# Patient Record
Sex: FEMALE | Race: WHITE
[De-identification: names, ages, dates, MRNs, and addresses within clinical notes are randomized per-mention and may not be internally consistent; named-entity substitution may affect disease eponyms.]

---

## 2020-01-19 ENCOUNTER — HOSPITAL ENCOUNTER (INPATIENT)
Dept: HOSPITAL 54 - ER | Age: 75
LOS: 3 days | Discharge: NURSING, REHAB OR LONG-TERM CARE | DRG: 133 | End: 2020-01-22
Attending: NURSE PRACTITIONER | Admitting: NURSE PRACTITIONER
Payer: MEDICAID

## 2020-01-19 VITALS — WEIGHT: 137 LBS | BODY MASS INDEX: 24.27 KG/M2 | HEIGHT: 63 IN

## 2020-01-19 VITALS — SYSTOLIC BLOOD PRESSURE: 124 MMHG | DIASTOLIC BLOOD PRESSURE: 58 MMHG

## 2020-01-19 VITALS — DIASTOLIC BLOOD PRESSURE: 64 MMHG | SYSTOLIC BLOOD PRESSURE: 126 MMHG

## 2020-01-19 VITALS — SYSTOLIC BLOOD PRESSURE: 118 MMHG | DIASTOLIC BLOOD PRESSURE: 60 MMHG

## 2020-01-19 VITALS — DIASTOLIC BLOOD PRESSURE: 57 MMHG | SYSTOLIC BLOOD PRESSURE: 126 MMHG

## 2020-01-19 DIAGNOSIS — Z99.2: ICD-10-CM

## 2020-01-19 DIAGNOSIS — G47.33: ICD-10-CM

## 2020-01-19 DIAGNOSIS — J96.01: Primary | ICD-10-CM

## 2020-01-19 DIAGNOSIS — N25.81: ICD-10-CM

## 2020-01-19 DIAGNOSIS — N17.9: ICD-10-CM

## 2020-01-19 DIAGNOSIS — I13.2: ICD-10-CM

## 2020-01-19 DIAGNOSIS — I27.20: ICD-10-CM

## 2020-01-19 DIAGNOSIS — E87.5: ICD-10-CM

## 2020-01-19 DIAGNOSIS — Z87.891: ICD-10-CM

## 2020-01-19 DIAGNOSIS — J44.9: ICD-10-CM

## 2020-01-19 DIAGNOSIS — Z95.828: ICD-10-CM

## 2020-01-19 DIAGNOSIS — E83.39: ICD-10-CM

## 2020-01-19 DIAGNOSIS — D63.8: ICD-10-CM

## 2020-01-19 DIAGNOSIS — N18.6: ICD-10-CM

## 2020-01-19 DIAGNOSIS — Z79.899: ICD-10-CM

## 2020-01-19 DIAGNOSIS — I50.9: ICD-10-CM

## 2020-01-19 LAB
ALBUMIN SERPL BCP-MCNC: 3.5 G/DL (ref 3.4–5)
ALP SERPL-CCNC: 214 U/L (ref 46–116)
ALT SERPL W P-5'-P-CCNC: 28 U/L (ref 12–78)
AST SERPL W P-5'-P-CCNC: 26 U/L (ref 15–37)
BASE EXCESS BLDA CALC-SCNC: -7.8 MMOL/L
BASOPHILS # BLD AUTO: 0 /CMM (ref 0–0.2)
BASOPHILS NFR BLD AUTO: 0.8 % (ref 0–2)
BILIRUB DIRECT SERPL-MCNC: 0.1 MG/DL (ref 0–0.2)
BILIRUB SERPL-MCNC: 0.4 MG/DL (ref 0.2–1)
BUN SERPL-MCNC: 99 MG/DL (ref 7–18)
CALCIUM SERPL-MCNC: 8.7 MG/DL (ref 8.5–10.1)
CHLORIDE SERPL-SCNC: 97 MMOL/L (ref 98–107)
CO2 SERPL-SCNC: 24 MMOL/L (ref 21–32)
CREAT SERPL-MCNC: 11.4 MG/DL (ref 0.6–1.3)
DO-HGB MFR BLDA: 143.3 MMHG
EOSINOPHIL NFR BLD AUTO: 3.4 % (ref 0–6)
GLUCOSE SERPL-MCNC: 97 MG/DL (ref 74–106)
HCT VFR BLD AUTO: 22 % (ref 33–45)
HGB BLD-MCNC: 7.2 G/DL (ref 11.5–14.8)
INHALED O2 CONCENTRATION: 36 %
INHALED O2 FLOW RATE: 4 L/MIN (ref 0–30)
LYMPHOCYTES NFR BLD AUTO: 0.3 /CMM (ref 0.8–4.8)
LYMPHOCYTES NFR BLD AUTO: 5.6 % (ref 20–44)
MCHC RBC AUTO-ENTMCNC: 33 G/DL (ref 31–36)
MCV RBC AUTO: 99 FL (ref 82–100)
MONOCYTES NFR BLD AUTO: 0.4 /CMM (ref 0.1–1.3)
MONOCYTES NFR BLD AUTO: 6.3 % (ref 2–12)
NEUTROPHILS # BLD AUTO: 5.3 /CMM (ref 1.8–8.9)
NEUTROPHILS NFR BLD AUTO: 83.9 % (ref 43–81)
PCO2 TEMP ADJ BLDA: 46.8 MMHG (ref 35–45)
PH TEMP ADJ BLDA: 7.23 [PH] (ref 7.35–7.45)
PLATELET # BLD AUTO: 267 /CMM (ref 150–450)
PO2 TEMP ADJ BLDA: 59.1 MMHG (ref 75–100)
POTASSIUM SERPL-SCNC: 7.3 MMOL/L (ref 3.5–5.1)
PROT SERPL-MCNC: 7.8 G/DL (ref 6.4–8.2)
RBC # BLD AUTO: 2.25 MIL/UL (ref 4–5.2)
SAO2 % BLDA: 80.3 % (ref 92–98.5)
SODIUM SERPL-SCNC: 137 MMOL/L (ref 136–145)
WBC NRBC COR # BLD AUTO: 6.3 K/UL (ref 4.3–11)

## 2020-01-19 PROCEDURE — A4216 STERILE WATER/SALINE, 10 ML: HCPCS

## 2020-01-19 PROCEDURE — G0378 HOSPITAL OBSERVATION PER HR: HCPCS

## 2020-01-19 RX ADMIN — CINACALCET HYDROCHLORIDE SCH MG: 30 TABLET, COATED ORAL at 23:34

## 2020-01-19 RX ADMIN — Medication SCH MG: at 23:34

## 2020-01-19 RX ADMIN — ATORVASTATIN CALCIUM SCH MG: 10 TABLET, FILM COATED ORAL at 23:34

## 2020-01-19 NOTE — NUR
RN ADMISSION NOTES,



RECEIVED PATIENT FROM ER DEPARTMENT  AT 2220 IN COMPANY OF 2 NURSES, A/O X3 ABLE TO 
VERBALIZED NEEDS, STATES FEELING HOT AND NOTED DIAPHORETIC, AND SWEATING, NS VAL AND NSR 
IN TELE MONITOR, CHECKED BLOOD SUGAR AND NOTED, 30M Gd/L, GAVE 2 JUICES VIA PO, AND CALLED 
REYNOSO ON CALL AND REPORTED CONDITION, REPLIED WITH NEW ORDER FRO DEXTROSE 50 INJ, NOTED 
AND CARRIED OUT, WITH VS 59, 104/45, 20, 98% AT 4LMP VIA NC, 95.8, SKIN INTACT, WILL 
CONTINUE TO MONITOR CLOSELY.

## 2020-01-19 NOTE — NUR
RN NOTES,



PATIENT INITIALLY WITH ORDER FOR ICU, TRANSFERRED TO ICU AT THI TIME, ENDORSED PATIENT TO ED 
CHARGE NURSE AND LOUISE RN, ENDORSED THAT PATIENT RECEIVED D50% AND NEED TO BE RECHECKED AT 
2317, PATIENT TRANSFERRED WITH ALL ACLS PROTOCOL.

## 2020-01-20 VITALS — SYSTOLIC BLOOD PRESSURE: 117 MMHG | DIASTOLIC BLOOD PRESSURE: 62 MMHG

## 2020-01-20 VITALS — SYSTOLIC BLOOD PRESSURE: 124 MMHG | DIASTOLIC BLOOD PRESSURE: 61 MMHG

## 2020-01-20 VITALS — SYSTOLIC BLOOD PRESSURE: 148 MMHG | DIASTOLIC BLOOD PRESSURE: 59 MMHG

## 2020-01-20 VITALS — DIASTOLIC BLOOD PRESSURE: 68 MMHG | SYSTOLIC BLOOD PRESSURE: 138 MMHG

## 2020-01-20 VITALS — SYSTOLIC BLOOD PRESSURE: 132 MMHG | DIASTOLIC BLOOD PRESSURE: 60 MMHG

## 2020-01-20 VITALS — SYSTOLIC BLOOD PRESSURE: 121 MMHG | DIASTOLIC BLOOD PRESSURE: 59 MMHG

## 2020-01-20 VITALS — SYSTOLIC BLOOD PRESSURE: 142 MMHG | DIASTOLIC BLOOD PRESSURE: 66 MMHG

## 2020-01-20 VITALS — SYSTOLIC BLOOD PRESSURE: 154 MMHG | DIASTOLIC BLOOD PRESSURE: 83 MMHG

## 2020-01-20 VITALS — DIASTOLIC BLOOD PRESSURE: 59 MMHG | SYSTOLIC BLOOD PRESSURE: 135 MMHG

## 2020-01-20 VITALS — SYSTOLIC BLOOD PRESSURE: 121 MMHG | DIASTOLIC BLOOD PRESSURE: 60 MMHG

## 2020-01-20 VITALS — SYSTOLIC BLOOD PRESSURE: 122 MMHG | DIASTOLIC BLOOD PRESSURE: 55 MMHG

## 2020-01-20 VITALS — SYSTOLIC BLOOD PRESSURE: 138 MMHG | DIASTOLIC BLOOD PRESSURE: 62 MMHG

## 2020-01-20 VITALS — SYSTOLIC BLOOD PRESSURE: 109 MMHG | DIASTOLIC BLOOD PRESSURE: 53 MMHG

## 2020-01-20 VITALS — SYSTOLIC BLOOD PRESSURE: 128 MMHG | DIASTOLIC BLOOD PRESSURE: 62 MMHG

## 2020-01-20 VITALS — DIASTOLIC BLOOD PRESSURE: 52 MMHG | SYSTOLIC BLOOD PRESSURE: 117 MMHG

## 2020-01-20 VITALS — DIASTOLIC BLOOD PRESSURE: 72 MMHG | SYSTOLIC BLOOD PRESSURE: 144 MMHG

## 2020-01-20 VITALS — SYSTOLIC BLOOD PRESSURE: 150 MMHG | DIASTOLIC BLOOD PRESSURE: 73 MMHG

## 2020-01-20 VITALS — SYSTOLIC BLOOD PRESSURE: 149 MMHG | DIASTOLIC BLOOD PRESSURE: 70 MMHG

## 2020-01-20 VITALS — DIASTOLIC BLOOD PRESSURE: 59 MMHG | SYSTOLIC BLOOD PRESSURE: 122 MMHG

## 2020-01-20 VITALS — SYSTOLIC BLOOD PRESSURE: 143 MMHG | DIASTOLIC BLOOD PRESSURE: 70 MMHG

## 2020-01-20 LAB
BASE EXCESS BLDA CALC-SCNC: 2.9 MMOL/L
BASOPHILS # BLD AUTO: 0 /CMM (ref 0–0.2)
BASOPHILS NFR BLD AUTO: 0.5 % (ref 0–2)
BUN SERPL-MCNC: 100 MG/DL (ref 7–18)
CALCIUM SERPL-MCNC: 8.3 MG/DL (ref 8.5–10.1)
CHLORIDE SERPL-SCNC: 95 MMOL/L (ref 98–107)
CHOLEST SERPL-MCNC: 141 MG/DL (ref ?–200)
CO2 SERPL-SCNC: 25 MMOL/L (ref 21–32)
CREAT SERPL-MCNC: 11.3 MG/DL (ref 0.6–1.3)
DO-HGB MFR BLDA: 120.2 MMHG
EOSINOPHIL NFR BLD AUTO: 1 % (ref 0–6)
GLUCOSE SERPL-MCNC: 63 MG/DL (ref 74–106)
HCT VFR BLD AUTO: 22 % (ref 33–45)
HDLC SERPL-MCNC: 108 MG/DL (ref 40–60)
HGB BLD-MCNC: 7.1 G/DL (ref 11.5–14.8)
INHALED O2 CONCENTRATION: 36 %
INHALED O2 FLOW RATE: 4 L/MIN (ref 0–30)
LDLC SERPL DIRECT ASSAY-MCNC: 26 MG/DL (ref 0–99)
LYMPHOCYTES NFR BLD AUTO: 0.4 /CMM (ref 0.8–4.8)
LYMPHOCYTES NFR BLD AUTO: 7 % (ref 20–44)
MAGNESIUM SERPL-MCNC: 3.1 MG/DL (ref 1.8–2.4)
MCHC RBC AUTO-ENTMCNC: 33 G/DL (ref 31–36)
MCV RBC AUTO: 99 FL (ref 82–100)
MONOCYTES NFR BLD AUTO: 0.3 /CMM (ref 0.1–1.3)
MONOCYTES NFR BLD AUTO: 5.3 % (ref 2–12)
NEUTROPHILS # BLD AUTO: 5.3 /CMM (ref 1.8–8.9)
NEUTROPHILS NFR BLD AUTO: 86.2 % (ref 43–81)
PCO2 TEMP ADJ BLDA: 43 MMHG (ref 35–45)
PH TEMP ADJ BLDA: 7.42 [PH] (ref 7.35–7.45)
PHOSPHATE SERPL-MCNC: 9.6 MG/DL (ref 2.5–4.9)
PLATELET # BLD AUTO: 251 /CMM (ref 150–450)
PO2 TEMP ADJ BLDA: 86.6 MMHG (ref 75–100)
POTASSIUM SERPL-SCNC: 7.1 MMOL/L (ref 3.5–5.1)
RBC # BLD AUTO: 2.18 MIL/UL (ref 4–5.2)
SAO2 % BLDA: 95 % (ref 92–98.5)
SODIUM SERPL-SCNC: 134 MMOL/L (ref 136–145)
TRIGL SERPL-MCNC: < 15 MG/DL (ref 30–150)
TSH SERPL DL<=0.005 MIU/L-ACNC: 2.65 UIU/ML (ref 0.36–3.74)
VENTILATION MODE VENT: (no result)
WBC NRBC COR # BLD AUTO: 6.2 K/UL (ref 4.3–11)

## 2020-01-20 PROCEDURE — 5A1D70Z PERFORMANCE OF URINARY FILTRATION, INTERMITTENT, LESS THAN 6 HOURS PER DAY: ICD-10-PCS | Performed by: INTERNAL MEDICINE

## 2020-01-20 RX ADMIN — ALBUTEROL SULFATE SCH MG: 1.25 SOLUTION RESPIRATORY (INHALATION) at 16:26

## 2020-01-20 RX ADMIN — CALCIUM CARBONATE-CHOLECALCIFEROL TAB 250 MG-125 UNIT SCH UDTAB: 250-125 TAB at 08:56

## 2020-01-20 RX ADMIN — Medication SCH MG: at 17:06

## 2020-01-20 RX ADMIN — FUROSEMIDE SCH MG: 40 TABLET ORAL at 17:06

## 2020-01-20 RX ADMIN — Medication SCH MG: at 07:21

## 2020-01-20 RX ADMIN — Medication SCH MG: at 09:00

## 2020-01-20 RX ADMIN — RENAGEL SCH MG: 800 TABLET ORAL at 13:11

## 2020-01-20 RX ADMIN — CLONIDINE HYDROCHLORIDE SCH MG: 0.1 TABLET ORAL at 17:06

## 2020-01-20 RX ADMIN — Medication SCH MG: at 23:14

## 2020-01-20 RX ADMIN — Medication SCH MG: at 16:26

## 2020-01-20 RX ADMIN — FUROSEMIDE SCH MG: 40 TABLET ORAL at 09:00

## 2020-01-20 RX ADMIN — CINACALCET HYDROCHLORIDE SCH MG: 30 TABLET, COATED ORAL at 23:14

## 2020-01-20 RX ADMIN — RENAGEL SCH MG: 800 TABLET ORAL at 17:06

## 2020-01-20 RX ADMIN — Medication SCH MG: at 13:11

## 2020-01-20 RX ADMIN — Medication SCH MG: at 08:56

## 2020-01-20 RX ADMIN — PANTOPRAZOLE SODIUM SCH MG: 40 TABLET, DELAYED RELEASE ORAL at 07:21

## 2020-01-20 RX ADMIN — Medication SCH MG: at 20:23

## 2020-01-20 RX ADMIN — CLONIDINE HYDROCHLORIDE SCH MG: 0.1 TABLET ORAL at 08:57

## 2020-01-20 RX ADMIN — ALBUTEROL SULFATE SCH MG: 1.25 SOLUTION RESPIRATORY (INHALATION) at 20:23

## 2020-01-20 RX ADMIN — RENAGEL SCH MG: 800 TABLET ORAL at 08:56

## 2020-01-20 RX ADMIN — ALBUTEROL SULFATE SCH MG: 1.25 SOLUTION RESPIRATORY (INHALATION) at 23:57

## 2020-01-20 RX ADMIN — ATORVASTATIN CALCIUM SCH MG: 10 TABLET, FILM COATED ORAL at 23:14

## 2020-01-20 RX ADMIN — Medication SCH MG: at 23:57

## 2020-01-20 NOTE — NUR
ICU RN AM NOTES

 

PT IN BED, ASLEEP, RESPONDS TO NAME AND TOUCH, ALERT/ORIENTED X 2-3, PERIOD OF CONFUSION, ON 
4L O2 VIA NASAL CANULA, REFUSE TO MOVE OR TURN AND BODY ASSESSMENT, SOB NOTED ON EXERTION OR 
TURNING, SINUS VAL TO SINUS RHYTHM HR 58 -63. DENIES PAIN, AFEBRILE, PARTH SHUNT THRILL 
PRESENT, LCW HD CATH CDI DRESSING. RT AC G 20 HL FLUSHES WELL, SITE CLEAR. WEARS DIAPER, 
CCHO DIET, SAFETY MEASURES IN PLACE, BED LOW LOCKED, WILL CONTINUE CLOSE MONITORING.

## 2020-01-20 NOTE — NUR
RN NOTES



PER TATO CHARGE NURSE, GIVE REPORT TO INCOMING NURSE AT TELE 1 FLOOR AND TRANSFER NEXT SHIFT.

## 2020-01-20 NOTE — NUR
TELE1/RN

ASSUMED CARE OF THIS PATIENT FOR CONTINUITY OF CARE, PATIENT IS AWAKE, ALERT, ORIENTED, MILD 
ANXIETY NOTED, NO DISTRESS NOTED, FALL RISK, FALL PRECAUTIONS IN PLACE. WILL MONITOR.

## 2020-01-20 NOTE — NUR
ICU RN

PT IS ANXIOUS, NONCOMPLIANT. CRITICAL LABS K-7.1, BUN-100, CREAT. 11.3, PHOS-9.6. JAYME REYNOSO WAS NOTIFIED. HE ORDERED ANOTHER KAYEXALATE 30 G PO & STATED THAT HD NURSE WILL COME 
AROUND 7-8 A.M. FOR STAT HD. BATH GIVEN, LINEN CHANGED. DENIES SOB OR PAIN. VITAL SIGNS 
STABLE.

## 2020-01-20 NOTE — NUR
TELE RN OPENING NOTE



RECEIVED PT TO -1 TRANSFERRED FROM ICU. PT AWAKE IN BED, ALERT AND ORIENTED TO NAME, 
PLACE AND TIME, ON 02 VIA NC 2L/MIN, SATURATING WELL, NO SIGNS OF RESPIRATORY DISTRESS 
NOTED. ON TELE MONITOR SINUS RHYTHM HR 68. DENIES PAIN. PARTH SHUNT THRILL PRESENT, LCW HD 
CATH DRESSING INTACT, CLEAN, DRY. IV SITE ON RIGHT AC G20 PATENT, INTACT, WITH HEP LOCK IN 
PLACE. INTRODUCED SELF TO PT, DISCUSSED PLAN OF CARE. BED IN LOW POSITION, LOCKED, CALL 
LIGHT WITHIN REACH.

## 2020-01-20 NOTE — NUR
ICU RN CLOSING NOTES

 

PT IN BED, RESTING COMFORTABLY, ALERT/ORIENTED X 2-3, PERIOD OF CONFUSION, ON 4L O2 VIA 
NASAL CANULA, SOB NOTED ON EXERTION OR TURNING, SINUS VAL TO SINUS RHYTHM HR 58 -68. 
DENIES PAIN, AFEBRILE, PARTH SHUNT THRILL PRESENT, LCW HD CATH CDI DRESSING. RT AC G 20 HL 
FLUSHES WELL, SITE CLEAR. WEARS DIAPER, CCHO DIET, SAFETY MEASURES IN PLACE, BED LOW LOCKED, 
PATIENT ABLE TO TURN SELF INDEPENDENTLY. PM CARE DONE. CALL LIGHT WITHIN REACH. ALL NEEDS 
MET AT THIS TIME. HOB ELEVATED. WILL ENDORSE TO NEXT SHIFT FOR DYAN.

## 2020-01-20 NOTE — NUR
ICU RN

 PT WAS TRANSFERRED FROM [facility] WITH DIAGNOSIS SEVERE HYPERKALEMIA. PT HAS ESRD, SHE MISSED HER 
SCHEDULED HD. BUN-99, CREAT-11.4, K-7.3. PT WAS GIVEN INSULIN 6 UNITS IV, D 50 IV, CALCIUM 
GLUCONATE IV, AND KAYEXALATE 30 G PO.PT IS AWAKE ALERT ORIENTED. ZAID. MOVES ALL 
EXTREMITIES, NO MOTOR OR SENSORY DEFICIT. C/O OF NO PAIN, SOB OR ANY OTHER DISTRESS. VSS, 
AFEBRILE, SCOPE-SB. 02 4 L VIA N/C, TOLERATES WELL. PT IS SUPPOSED TO HAVE NEPHROLOGY & 
PULMONOLOGY CONSULTATION IN A.M. AS WELL AS CTA WITHOUT CONTRAST TO R/O LUNG MASS. WILL 
CONTINUE CLOSE MONITORING.

## 2020-01-21 VITALS — SYSTOLIC BLOOD PRESSURE: 148 MMHG | DIASTOLIC BLOOD PRESSURE: 71 MMHG

## 2020-01-21 VITALS — SYSTOLIC BLOOD PRESSURE: 195 MMHG | DIASTOLIC BLOOD PRESSURE: 78 MMHG

## 2020-01-21 VITALS — DIASTOLIC BLOOD PRESSURE: 59 MMHG | SYSTOLIC BLOOD PRESSURE: 145 MMHG

## 2020-01-21 VITALS — DIASTOLIC BLOOD PRESSURE: 79 MMHG | SYSTOLIC BLOOD PRESSURE: 185 MMHG

## 2020-01-21 VITALS — DIASTOLIC BLOOD PRESSURE: 45 MMHG | SYSTOLIC BLOOD PRESSURE: 116 MMHG

## 2020-01-21 VITALS — DIASTOLIC BLOOD PRESSURE: 84 MMHG | SYSTOLIC BLOOD PRESSURE: 182 MMHG

## 2020-01-21 VITALS — DIASTOLIC BLOOD PRESSURE: 63 MMHG | SYSTOLIC BLOOD PRESSURE: 136 MMHG

## 2020-01-21 VITALS — SYSTOLIC BLOOD PRESSURE: 136 MMHG | DIASTOLIC BLOOD PRESSURE: 63 MMHG

## 2020-01-21 VITALS — SYSTOLIC BLOOD PRESSURE: 178 MMHG | DIASTOLIC BLOOD PRESSURE: 87 MMHG

## 2020-01-21 VITALS — DIASTOLIC BLOOD PRESSURE: 65 MMHG | SYSTOLIC BLOOD PRESSURE: 192 MMHG

## 2020-01-21 VITALS — DIASTOLIC BLOOD PRESSURE: 110 MMHG | SYSTOLIC BLOOD PRESSURE: 200 MMHG

## 2020-01-21 LAB
BASOPHILS # BLD AUTO: 0 /CMM (ref 0–0.2)
BASOPHILS NFR BLD AUTO: 0.8 % (ref 0–2)
BUN SERPL-MCNC: 57 MG/DL (ref 7–18)
CALCIUM SERPL-MCNC: 8 MG/DL (ref 8.5–10.1)
CHLORIDE SERPL-SCNC: 96 MMOL/L (ref 98–107)
CO2 SERPL-SCNC: 28 MMOL/L (ref 21–32)
CREAT SERPL-MCNC: 7.7 MG/DL (ref 0.6–1.3)
EOSINOPHIL NFR BLD AUTO: 5.2 % (ref 0–6)
EOSINOPHIL NFR BLD MANUAL: 6 % (ref 0–4)
GLUCOSE SERPL-MCNC: 86 MG/DL (ref 74–106)
HCT VFR BLD AUTO: 19 % (ref 33–45)
HGB BLD-MCNC: 6.1 G/DL (ref 11.5–14.8)
LYMPHOCYTES NFR BLD AUTO: 0.5 /CMM (ref 0.8–4.8)
LYMPHOCYTES NFR BLD AUTO: 12.7 % (ref 20–44)
LYMPHOCYTES NFR BLD MANUAL: 14 % (ref 16–48)
MCHC RBC AUTO-ENTMCNC: 33 G/DL (ref 31–36)
MCV RBC AUTO: 98 FL (ref 82–100)
MONOCYTES NFR BLD AUTO: 0.5 /CMM (ref 0.1–1.3)
MONOCYTES NFR BLD AUTO: 12.4 % (ref 2–12)
MONOCYTES NFR BLD MANUAL: 12 % (ref 0–11)
NEUTROPHILS # BLD AUTO: 3 /CMM (ref 1.8–8.9)
NEUTROPHILS NFR BLD AUTO: 68.9 % (ref 43–81)
NEUTS SEG NFR BLD MANUAL: 68 % (ref 42–76)
PLATELET # BLD AUTO: 228 /CMM (ref 150–450)
POTASSIUM SERPL-SCNC: 4.7 MMOL/L (ref 3.5–5.1)
RBC # BLD AUTO: 1.88 MIL/UL (ref 4–5.2)
SODIUM SERPL-SCNC: 136 MMOL/L (ref 136–145)
WBC NRBC COR # BLD AUTO: 4.3 K/UL (ref 4.3–11)

## 2020-01-21 PROCEDURE — 30233N1 TRANSFUSION OF NONAUTOLOGOUS RED BLOOD CELLS INTO PERIPHERAL VEIN, PERCUTANEOUS APPROACH: ICD-10-PCS | Performed by: INTERNAL MEDICINE

## 2020-01-21 RX ADMIN — Medication SCH MG: at 16:22

## 2020-01-21 RX ADMIN — ALBUTEROL SULFATE SCH MG: 1.25 SOLUTION RESPIRATORY (INHALATION) at 10:48

## 2020-01-21 RX ADMIN — Medication SCH MG: at 12:32

## 2020-01-21 RX ADMIN — Medication SCH MG: at 03:26

## 2020-01-21 RX ADMIN — Medication SCH MG: at 22:02

## 2020-01-21 RX ADMIN — RENAGEL SCH MG: 800 TABLET ORAL at 12:32

## 2020-01-21 RX ADMIN — CLONIDINE HYDROCHLORIDE SCH MG: 0.1 TABLET ORAL at 12:48

## 2020-01-21 RX ADMIN — PANTOPRAZOLE SODIUM SCH MG: 40 TABLET, DELAYED RELEASE ORAL at 08:33

## 2020-01-21 RX ADMIN — RENAGEL SCH MG: 800 TABLET ORAL at 08:33

## 2020-01-21 RX ADMIN — Medication SCH MG: at 16:18

## 2020-01-21 RX ADMIN — ALBUTEROL SULFATE SCH MG: 1.25 SOLUTION RESPIRATORY (INHALATION) at 16:18

## 2020-01-21 RX ADMIN — CALCIUM CARBONATE-CHOLECALCIFEROL TAB 250 MG-125 UNIT SCH UDTAB: 250-125 TAB at 08:34

## 2020-01-21 RX ADMIN — ALBUTEROL SULFATE SCH MG: 1.25 SOLUTION RESPIRATORY (INHALATION) at 20:31

## 2020-01-21 RX ADMIN — FUROSEMIDE SCH MG: 40 TABLET ORAL at 16:22

## 2020-01-21 RX ADMIN — Medication SCH MG: at 17:13

## 2020-01-21 RX ADMIN — FUROSEMIDE SCH MG: 40 TABLET ORAL at 08:34

## 2020-01-21 RX ADMIN — ALBUTEROL SULFATE SCH MG: 1.25 SOLUTION RESPIRATORY (INHALATION) at 07:37

## 2020-01-21 RX ADMIN — RENAGEL SCH MG: 800 TABLET ORAL at 16:21

## 2020-01-21 RX ADMIN — Medication SCH MG: at 08:34

## 2020-01-21 RX ADMIN — Medication SCH MG: at 07:38

## 2020-01-21 RX ADMIN — Medication SCH MG: at 08:33

## 2020-01-21 RX ADMIN — CINACALCET HYDROCHLORIDE SCH MG: 30 TABLET, COATED ORAL at 22:03

## 2020-01-21 RX ADMIN — Medication SCH MG: at 20:30

## 2020-01-21 RX ADMIN — CLONIDINE HYDROCHLORIDE SCH MG: 0.1 TABLET ORAL at 16:22

## 2020-01-21 RX ADMIN — CLONIDINE HYDROCHLORIDE SCH MG: 0.1 TABLET ORAL at 08:34

## 2020-01-21 RX ADMIN — Medication SCH MG: at 23:40

## 2020-01-21 RX ADMIN — Medication SCH MG: at 10:48

## 2020-01-21 RX ADMIN — ALBUTEROL SULFATE SCH MG: 1.25 SOLUTION RESPIRATORY (INHALATION) at 23:40

## 2020-01-21 RX ADMIN — ALBUTEROL SULFATE SCH MG: 1.25 SOLUTION RESPIRATORY (INHALATION) at 03:25

## 2020-01-21 RX ADMIN — ATORVASTATIN CALCIUM SCH MG: 10 TABLET, FILM COATED ORAL at 22:02

## 2020-01-21 NOTE — NUR
MS RN OPENING NOTES,



RECEIVED PATIENT IN BED AWAKE, RESTING COMFORTABLY, A/O X 2, ON 2L NS TOLERATING WELL. NO 
SOB OR ACUTE DISTRESS NOTED AT THIS TIME. IV ON RAC #20, LCW HD CATH, L ARM AV SHUNT, BED IN 
LOW/LOCKED POSITION. CALL LIGHT WITHIN REACH, WILL CONTINUE TO MONITOR.

## 2020-01-21 NOTE — NUR
TELE RN NOTES

 

On HD now start blood transfusion 1 unit of packed red blood cells by hd nurse Will continue 
to monitor and assess per transfusion protocol.

## 2020-01-21 NOTE — NUR
ms rn note 

 all needs attended having dinner , able to eat self not in distress at this time, will cont 
to monitor

## 2020-01-21 NOTE — NUR
tele rn note 

 patient in bed,2, on tele monitor hr 65 , hl rt ac hl intact ,lt cw hd cath in place, awake 
alert oriented x2, rt at bedside on breathing tx at this time , plan of care discussed with 
patient,lt arm av shunt palpable but using for hd at this time , bed in lowest and locked 
position , will cont to monitor,call light within reach

## 2020-01-21 NOTE — NUR
tele rn note

 hg 6.1 dr dr gannon at bedside aware about this with order to transfuse 1 unit prbs with hd 
, also Catapres and Procardia hold at this time , patient will have hd

## 2020-01-21 NOTE — NUR
TELE1/RN

PATIENT SLEEPING, AROUSABLE, APPEAR COMFORTABLE, NO SIGNS OF DISTRESS NOTED ALL NEEDS 
ATTENDED AT THIS TIME, WILL CONTINUE TO MONITOR.

## 2020-01-22 VITALS — DIASTOLIC BLOOD PRESSURE: 58 MMHG | SYSTOLIC BLOOD PRESSURE: 140 MMHG

## 2020-01-22 VITALS — DIASTOLIC BLOOD PRESSURE: 65 MMHG | SYSTOLIC BLOOD PRESSURE: 128 MMHG

## 2020-01-22 VITALS — SYSTOLIC BLOOD PRESSURE: 128 MMHG | DIASTOLIC BLOOD PRESSURE: 65 MMHG

## 2020-01-22 VITALS — SYSTOLIC BLOOD PRESSURE: 145 MMHG | DIASTOLIC BLOOD PRESSURE: 70 MMHG

## 2020-01-22 LAB
ALBUMIN SERPL BCP-MCNC: 3.1 G/DL (ref 3.4–5)
ALP SERPL-CCNC: 186 U/L (ref 46–116)
ALT SERPL W P-5'-P-CCNC: 30 U/L (ref 12–78)
AST SERPL W P-5'-P-CCNC: 22 U/L (ref 15–37)
BASOPHILS # BLD AUTO: 0 /CMM (ref 0–0.2)
BASOPHILS NFR BLD AUTO: 0.7 % (ref 0–2)
BILIRUB SERPL-MCNC: 0.4 MG/DL (ref 0.2–1)
BUN SERPL-MCNC: 53 MG/DL (ref 7–18)
CALCIUM SERPL-MCNC: 8.3 MG/DL (ref 8.5–10.1)
CHLORIDE SERPL-SCNC: 100 MMOL/L (ref 98–107)
CO2 SERPL-SCNC: 27 MMOL/L (ref 21–32)
CREAT SERPL-MCNC: 6.3 MG/DL (ref 0.6–1.3)
EOSINOPHIL NFR BLD AUTO: 12 % (ref 0–6)
GLUCOSE SERPL-MCNC: 86 MG/DL (ref 74–106)
HCT VFR BLD AUTO: 23 % (ref 33–45)
HGB BLD-MCNC: 7.4 G/DL (ref 11.5–14.8)
LYMPHOCYTES NFR BLD AUTO: 0.6 /CMM (ref 0.8–4.8)
LYMPHOCYTES NFR BLD AUTO: 11.9 % (ref 20–44)
MAGNESIUM SERPL-MCNC: 2.4 MG/DL (ref 1.8–2.4)
MCHC RBC AUTO-ENTMCNC: 33 G/DL (ref 31–36)
MCV RBC AUTO: 97 FL (ref 82–100)
MONOCYTES NFR BLD AUTO: 0.5 /CMM (ref 0.1–1.3)
MONOCYTES NFR BLD AUTO: 10.8 % (ref 2–12)
NEUTROPHILS # BLD AUTO: 3.2 /CMM (ref 1.8–8.9)
NEUTROPHILS NFR BLD AUTO: 64.6 % (ref 43–81)
PHOSPHATE SERPL-MCNC: 6.7 MG/DL (ref 2.5–4.9)
PLATELET # BLD AUTO: 207 /CMM (ref 150–450)
POTASSIUM SERPL-SCNC: 4.9 MMOL/L (ref 3.5–5.1)
PROT SERPL-MCNC: 7.1 G/DL (ref 6.4–8.2)
RBC # BLD AUTO: 2.33 MIL/UL (ref 4–5.2)
SODIUM SERPL-SCNC: 139 MMOL/L (ref 136–145)
WBC NRBC COR # BLD AUTO: 5 K/UL (ref 4.3–11)

## 2020-01-22 RX ADMIN — Medication SCH MG: at 17:42

## 2020-01-22 RX ADMIN — Medication SCH MG: at 15:04

## 2020-01-22 RX ADMIN — ALBUTEROL SULFATE SCH MG: 1.25 SOLUTION RESPIRATORY (INHALATION) at 15:04

## 2020-01-22 RX ADMIN — ALBUTEROL SULFATE SCH MG: 1.25 SOLUTION RESPIRATORY (INHALATION) at 07:08

## 2020-01-22 RX ADMIN — Medication SCH MG: at 13:20

## 2020-01-22 RX ADMIN — Medication SCH MG: at 03:43

## 2020-01-22 RX ADMIN — Medication SCH MG: at 09:29

## 2020-01-22 RX ADMIN — Medication SCH MG: at 09:28

## 2020-01-22 RX ADMIN — RENAGEL SCH MG: 800 TABLET ORAL at 17:42

## 2020-01-22 RX ADMIN — CALCIUM CARBONATE-CHOLECALCIFEROL TAB 250 MG-125 UNIT SCH UDTAB: 250-125 TAB at 09:37

## 2020-01-22 RX ADMIN — CLONIDINE HYDROCHLORIDE SCH MG: 0.1 TABLET ORAL at 09:30

## 2020-01-22 RX ADMIN — Medication SCH MG: at 07:09

## 2020-01-22 RX ADMIN — Medication SCH MG: at 17:43

## 2020-01-22 RX ADMIN — RENAGEL SCH MG: 800 TABLET ORAL at 13:20

## 2020-01-22 RX ADMIN — CLONIDINE HYDROCHLORIDE SCH MG: 0.1 TABLET ORAL at 17:42

## 2020-01-22 RX ADMIN — FUROSEMIDE SCH MG: 40 TABLET ORAL at 09:30

## 2020-01-22 RX ADMIN — ALBUTEROL SULFATE SCH MG: 1.25 SOLUTION RESPIRATORY (INHALATION) at 11:12

## 2020-01-22 RX ADMIN — ALBUTEROL SULFATE SCH MG: 1.25 SOLUTION RESPIRATORY (INHALATION) at 03:43

## 2020-01-22 RX ADMIN — FUROSEMIDE SCH MG: 40 TABLET ORAL at 17:42

## 2020-01-22 RX ADMIN — PANTOPRAZOLE SODIUM SCH MG: 40 TABLET, DELAYED RELEASE ORAL at 09:30

## 2020-01-22 RX ADMIN — RENAGEL SCH MG: 800 TABLET ORAL at 09:29

## 2020-01-22 RX ADMIN — Medication SCH MG: at 09:32

## 2020-01-22 RX ADMIN — Medication SCH MG: at 11:12

## 2020-01-22 NOTE — NUR
MS RN CLOSING NOTES,



PATIENT IN BED RESTING, RESTING COMFORTABLY, A/O X 2, ON 2L NS TOLERATING WELL. NO SOB OR 
ACUTE DISTRESS NOTED AT THIS TIME. IV ON RAC #20, LCW HD CATH, L ARM AV SHUNT, BED IN 
LOW/LOCKED POSITION. CALL LIGHT WITHIN REACH,ENDORSED THE PATIENT TO AM RN FOR DAYN.

## 2020-02-18 ENCOUNTER — HOSPITAL ENCOUNTER (INPATIENT)
Dept: HOSPITAL 54 - ER | Age: 75
LOS: 2 days | Discharge: NURSING, REHAB OR LONG-TERM CARE | DRG: 133 | End: 2020-02-20
Attending: NURSE PRACTITIONER | Admitting: NURSE PRACTITIONER
Payer: MEDICAID

## 2020-02-18 VITALS — DIASTOLIC BLOOD PRESSURE: 55 MMHG | SYSTOLIC BLOOD PRESSURE: 119 MMHG

## 2020-02-18 VITALS — SYSTOLIC BLOOD PRESSURE: 123 MMHG | DIASTOLIC BLOOD PRESSURE: 57 MMHG

## 2020-02-18 VITALS — SYSTOLIC BLOOD PRESSURE: 106 MMHG | DIASTOLIC BLOOD PRESSURE: 52 MMHG

## 2020-02-18 VITALS — DIASTOLIC BLOOD PRESSURE: 63 MMHG | SYSTOLIC BLOOD PRESSURE: 93 MMHG

## 2020-02-18 VITALS — DIASTOLIC BLOOD PRESSURE: 59 MMHG | SYSTOLIC BLOOD PRESSURE: 128 MMHG

## 2020-02-18 VITALS — DIASTOLIC BLOOD PRESSURE: 62 MMHG | SYSTOLIC BLOOD PRESSURE: 146 MMHG

## 2020-02-18 VITALS — DIASTOLIC BLOOD PRESSURE: 60 MMHG | SYSTOLIC BLOOD PRESSURE: 129 MMHG

## 2020-02-18 VITALS — DIASTOLIC BLOOD PRESSURE: 59 MMHG | SYSTOLIC BLOOD PRESSURE: 135 MMHG

## 2020-02-18 VITALS — DIASTOLIC BLOOD PRESSURE: 55 MMHG | SYSTOLIC BLOOD PRESSURE: 117 MMHG

## 2020-02-18 VITALS — DIASTOLIC BLOOD PRESSURE: 56 MMHG | SYSTOLIC BLOOD PRESSURE: 141 MMHG

## 2020-02-18 VITALS — DIASTOLIC BLOOD PRESSURE: 65 MMHG | SYSTOLIC BLOOD PRESSURE: 143 MMHG

## 2020-02-18 VITALS — DIASTOLIC BLOOD PRESSURE: 59 MMHG | SYSTOLIC BLOOD PRESSURE: 124 MMHG

## 2020-02-18 VITALS — SYSTOLIC BLOOD PRESSURE: 124 MMHG | DIASTOLIC BLOOD PRESSURE: 58 MMHG

## 2020-02-18 VITALS — SYSTOLIC BLOOD PRESSURE: 154 MMHG | DIASTOLIC BLOOD PRESSURE: 71 MMHG

## 2020-02-18 VITALS — HEIGHT: 63 IN | BODY MASS INDEX: 24.1 KG/M2 | WEIGHT: 136 LBS

## 2020-02-18 VITALS — SYSTOLIC BLOOD PRESSURE: 149 MMHG | DIASTOLIC BLOOD PRESSURE: 66 MMHG

## 2020-02-18 VITALS — DIASTOLIC BLOOD PRESSURE: 63 MMHG | SYSTOLIC BLOOD PRESSURE: 148 MMHG

## 2020-02-18 DIAGNOSIS — I50.33: ICD-10-CM

## 2020-02-18 DIAGNOSIS — Z91.15: ICD-10-CM

## 2020-02-18 DIAGNOSIS — N25.81: ICD-10-CM

## 2020-02-18 DIAGNOSIS — I13.2: ICD-10-CM

## 2020-02-18 DIAGNOSIS — Z99.2: ICD-10-CM

## 2020-02-18 DIAGNOSIS — Z91.19: ICD-10-CM

## 2020-02-18 DIAGNOSIS — D63.1: ICD-10-CM

## 2020-02-18 DIAGNOSIS — E87.1: ICD-10-CM

## 2020-02-18 DIAGNOSIS — G47.33: ICD-10-CM

## 2020-02-18 DIAGNOSIS — I25.10: ICD-10-CM

## 2020-02-18 DIAGNOSIS — J44.9: ICD-10-CM

## 2020-02-18 DIAGNOSIS — E83.41: ICD-10-CM

## 2020-02-18 DIAGNOSIS — R74.0: ICD-10-CM

## 2020-02-18 DIAGNOSIS — E87.5: ICD-10-CM

## 2020-02-18 DIAGNOSIS — E11.22: ICD-10-CM

## 2020-02-18 DIAGNOSIS — J96.01: Primary | ICD-10-CM

## 2020-02-18 DIAGNOSIS — E83.39: ICD-10-CM

## 2020-02-18 DIAGNOSIS — E78.5: ICD-10-CM

## 2020-02-18 DIAGNOSIS — J45.909: ICD-10-CM

## 2020-02-18 DIAGNOSIS — N18.6: ICD-10-CM

## 2020-02-18 LAB
ALBUMIN SERPL BCP-MCNC: 3.7 G/DL (ref 3.4–5)
ALP SERPL-CCNC: 209 U/L (ref 46–116)
ALT SERPL W P-5'-P-CCNC: 23 U/L (ref 12–78)
AST SERPL W P-5'-P-CCNC: 27 U/L (ref 15–37)
BASE EXCESS BLDA CALC-SCNC: -1.8 MMOL/L
BASE EXCESS BLDA CALC-SCNC: 2.2 MMOL/L
BASOPHILS # BLD AUTO: 0.1 /CMM (ref 0–0.2)
BASOPHILS NFR BLD AUTO: 1.2 % (ref 0–2)
BILIRUB DIRECT SERPL-MCNC: 0.1 MG/DL (ref 0–0.2)
BILIRUB SERPL-MCNC: 0.4 MG/DL (ref 0.2–1)
BUN SERPL-MCNC: 79 MG/DL (ref 7–18)
CALCIUM SERPL-MCNC: 8.5 MG/DL (ref 8.5–10.1)
CHLORIDE SERPL-SCNC: 94 MMOL/L (ref 98–107)
CO2 SERPL-SCNC: 24 MMOL/L (ref 21–32)
CREAT SERPL-MCNC: 13 MG/DL (ref 0.6–1.3)
DO-HGB MFR BLDA: 186.8 MMHG
DO-HGB MFR BLDA: 85.2 MMHG
EOSINOPHIL NFR BLD AUTO: 11.2 % (ref 0–6)
FERRITIN SERPL-MCNC: 1437 NG/ML (ref 8–388)
GLUCOSE SERPL-MCNC: 93 MG/DL (ref 74–106)
HCT VFR BLD AUTO: 25 % (ref 33–45)
HGB BLD-MCNC: 8 G/DL (ref 11.5–14.8)
INHALED O2 CONCENTRATION: 100 %
INHALED O2 CONCENTRATION: 28 %
IRON SERPL-MCNC: 70 UG/DL (ref 50–175)
LYMPHOCYTES NFR BLD AUTO: 0.5 /CMM (ref 0.8–4.8)
LYMPHOCYTES NFR BLD AUTO: 11.8 % (ref 20–44)
MAGNESIUM SERPL-MCNC: 2.7 MG/DL (ref 1.8–2.4)
MCHC RBC AUTO-ENTMCNC: 33 G/DL (ref 31–36)
MCV RBC AUTO: 94 FL (ref 82–100)
MONOCYTES NFR BLD AUTO: 0.4 /CMM (ref 0.1–1.3)
MONOCYTES NFR BLD AUTO: 7.7 % (ref 2–12)
NEUTROPHILS # BLD AUTO: 3.1 /CMM (ref 1.8–8.9)
NEUTROPHILS NFR BLD AUTO: 68.1 % (ref 43–81)
NT-PROBNP SERPL-MCNC: (no result) PG/ML (ref 0–125)
PCO2 TEMP ADJ BLDA: 41.7 MMHG (ref 35–45)
PCO2 TEMP ADJ BLDA: 45.6 MMHG (ref 35–45)
PH TEMP ADJ BLDA: 7.34 [PH] (ref 7.35–7.45)
PH TEMP ADJ BLDA: 7.43 [PH] (ref 7.35–7.45)
PHOSPHATE SERPL-MCNC: 6.1 MG/DL (ref 2.5–4.9)
PLATELET # BLD AUTO: 223 /CMM (ref 150–450)
PO2 TEMP ADJ BLDA: 480.6 MMHG (ref 75–100)
PO2 TEMP ADJ BLDA: 65.2 MMHG (ref 75–100)
POTASSIUM SERPL-SCNC: 5.6 MMOL/L (ref 3.5–5.1)
PROT SERPL-MCNC: 7.7 G/DL (ref 6.4–8.2)
RBC # BLD AUTO: 2.61 MIL/UL (ref 4–5.2)
SAO2 % BLDA: 90.8 % (ref 92–98.5)
SAO2 % BLDA: 99.4 % (ref 92–98.5)
SET RATE, BG: 12
SODIUM SERPL-SCNC: 132 MMOL/L (ref 136–145)
TIBC SERPL-MCNC: 175 UG/DL (ref 250–450)
TSH SERPL DL<=0.005 MIU/L-ACNC: 4.38 UIU/ML (ref 0.36–3.74)
VENTILATION MODE VENT: (no result)
WBC NRBC COR # BLD AUTO: 4.6 K/UL (ref 4.3–11)

## 2020-02-18 PROCEDURE — G0378 HOSPITAL OBSERVATION PER HR: HCPCS

## 2020-02-18 PROCEDURE — 5A09357 ASSISTANCE WITH RESPIRATORY VENTILATION, LESS THAN 24 CONSECUTIVE HOURS, CONTINUOUS POSITIVE AIRWAY PRESSURE: ICD-10-PCS | Performed by: INTERNAL MEDICINE

## 2020-02-18 PROCEDURE — 5A1D70Z PERFORMANCE OF URINARY FILTRATION, INTERMITTENT, LESS THAN 6 HOURS PER DAY: ICD-10-PCS | Performed by: INTERNAL MEDICINE

## 2020-02-18 NOTE — NUR
RN NOTE



1200: Done with HD, HD nurse reported 2L out. Placed on 2LPM O2 via NC.



1420: ABG done, Dr. Aleman aware for the result.

## 2020-02-18 NOTE — NUR
RN OPENING NOTES



RECEIVED BEDSIDE REPORT FROM [facility] FOR DYAN. PER REPORT PT DOWNGRADE FROM ICU. ON TELE 
MONITOR SR WITH HR 70'S. PATIENT AWAKE, A/OX4, ABLE TO VERBALIZE NEEDS. DENIES ANY PAIN AT 
THE MOMENT. ON OXYGEN 2L VIA NASAL CANNULA, HOWEVER COMPLAINING OF SOB, INCREASED O2 TO 3L 
FOR NOW, STATED SHE FEELS BETTER. IV SITE RIGHT AC 18G, INTACT & FLUSHING WELL, S/L. LEFT 
CHEST WALL HD CATHETER IN PLACE AND INTACT. ON DIAPER. SAFETY MEASURES IN PLACE; BED IS IN 
LOW AND LOCKED POSITION, CALL LIGHT WITHIN REACH, SIDE RAILS UP X3, HOB ELEVATED TO 40 
DEGREES. WILL CONT TO MONITOR PT CLOSELY.

## 2020-02-18 NOTE — NUR
RN NOTE



1830: Transferred via bed, no significant changes noted. VSS. Report given to Donna NGUYỄN for 
DYAN. Still on 2 LPM of O2 via NC, tolerated. No respiratory distress noted.

## 2020-02-18 NOTE — NUR
BIBRA60 FROM [facility] C/O SOB X20MIN PTA. REC'D 2.5MG ALBUTEROL AT FACILITY 
AND 5MG ALBUTEROL EN ROUTE. REFUSED DIALYSIS 2X, LAST DIALYSIS X1 WEEK AGO. PT 
ALERT, OX4. ARRIVED ON O2 AT 5LPM VIA NC. PLACED ON A MONITOR ,

## 2020-02-18 NOTE — NUR
RN NOTE



0910: Admitted 74F from ED for emergency HD. Patient is on Bipap during transfer, A/Ox3 but 
lethargic. VSS. PARTH AV fistula. With LCW HD cath, RAC PIV 18. SR on the monitor. Skin 
assessment done, noted intact.



0930: S/E by Dr. Aleman, with order to titrate of Bipap after HD then do ABG.



0940: HD nurse at bedside for HD.

## 2020-02-18 NOTE — NUR
TELE RN NOTES

RECEIVED PT FROM ICU ALERT ORIENTED. ON 2L NC. SAT 94%. PLACED ON TELEMONITOR. HR 70. PT HAS 
A RIGHT AC. INTACT & FLUSHING WELL. LEFT CW HEMODIALYSIS CATHETER IN PLACE. VS TAKEN. BED 
LOW AND LOCKED POSITION. CALL LIGHT WITHIN REACH. WILL ENDORSE CARE  TO THE NEXT SHIFT.

## 2020-02-18 NOTE — NUR
RN NOTE



Per CN, patient may go to tele, made Rich RN aware with order for Bipap at night, said it is 
ok. Tried to call tele1 to give report but RN unavailable. Per Belkis MENDOZA, Donna will call ICU 
when ready. Made patient aware for the order of transfer, verbalized understanding. Patient 
still stable on 2 LPM O2 via NC. Kept HOB elevated.

## 2020-02-18 NOTE — NUR
RT END OF THE SHIFT REPORT, 

PT 74 Y OLD FEMALE REC. 0700 IN ER ON BIPAP FROM PM SHIFT.

@0855 PT. TRANSFERRED TO ICU ROOM 252

ON VENT WITH NOTED SETTINGS, ALARMS ARE SET AND FUNCTIONAL, EQUAL CHEST RISE NOTED B/S RALES 
BILATERALLY./

BIPAP PLUGGED INTO RED OUTLET, AMBU BAG AT THE BEDSIDE.  

NO CHANGES REPORT WILL PASS NEXT RT

-------------------------------------------------------------------------------

Addendum: 02/18/20 at 1853 by GAUTAM GREY RT

-------------------------------------------------------------------------------

Amended: Links added.

## 2020-02-19 VITALS — DIASTOLIC BLOOD PRESSURE: 76 MMHG | SYSTOLIC BLOOD PRESSURE: 121 MMHG

## 2020-02-19 VITALS — DIASTOLIC BLOOD PRESSURE: 66 MMHG | SYSTOLIC BLOOD PRESSURE: 117 MMHG

## 2020-02-19 VITALS — DIASTOLIC BLOOD PRESSURE: 67 MMHG | SYSTOLIC BLOOD PRESSURE: 167 MMHG

## 2020-02-19 VITALS — SYSTOLIC BLOOD PRESSURE: 117 MMHG | DIASTOLIC BLOOD PRESSURE: 66 MMHG

## 2020-02-19 VITALS — SYSTOLIC BLOOD PRESSURE: 158 MMHG | DIASTOLIC BLOOD PRESSURE: 66 MMHG

## 2020-02-19 VITALS — DIASTOLIC BLOOD PRESSURE: 77 MMHG | SYSTOLIC BLOOD PRESSURE: 172 MMHG

## 2020-02-19 VITALS — SYSTOLIC BLOOD PRESSURE: 121 MMHG | DIASTOLIC BLOOD PRESSURE: 76 MMHG

## 2020-02-19 VITALS — SYSTOLIC BLOOD PRESSURE: 172 MMHG | DIASTOLIC BLOOD PRESSURE: 77 MMHG

## 2020-02-19 LAB
ALBUMIN SERPL BCP-MCNC: 3.3 G/DL (ref 3.4–5)
ALP SERPL-CCNC: 182 U/L (ref 46–116)
ALT SERPL W P-5'-P-CCNC: 19 U/L (ref 12–78)
AST SERPL W P-5'-P-CCNC: 24 U/L (ref 15–37)
BASOPHILS # BLD AUTO: 0 /CMM (ref 0–0.2)
BASOPHILS NFR BLD AUTO: 0.9 % (ref 0–2)
BILIRUB SERPL-MCNC: 0.5 MG/DL (ref 0.2–1)
BUN SERPL-MCNC: 46 MG/DL (ref 7–18)
CALCIUM SERPL-MCNC: 8.8 MG/DL (ref 8.5–10.1)
CHLORIDE SERPL-SCNC: 97 MMOL/L (ref 98–107)
CHOLEST SERPL-MCNC: 161 MG/DL (ref ?–200)
CO2 SERPL-SCNC: 27 MMOL/L (ref 21–32)
CREAT SERPL-MCNC: 8.8 MG/DL (ref 0.6–1.3)
EOSINOPHIL NFR BLD AUTO: 10.3 % (ref 0–6)
GLUCOSE SERPL-MCNC: 73 MG/DL (ref 74–106)
HCT VFR BLD AUTO: 23 % (ref 33–45)
HDLC SERPL-MCNC: 115 MG/DL (ref 40–60)
HGB BLD-MCNC: 7.7 G/DL (ref 11.5–14.8)
IRON SERPL-MCNC: 95 UG/DL (ref 50–175)
LDLC SERPL DIRECT ASSAY-MCNC: 28 MG/DL (ref 0–99)
LYMPHOCYTES NFR BLD AUTO: 0.6 /CMM (ref 0.8–4.8)
LYMPHOCYTES NFR BLD AUTO: 11.3 % (ref 20–44)
MAGNESIUM SERPL-MCNC: 2.4 MG/DL (ref 1.8–2.4)
MCHC RBC AUTO-ENTMCNC: 33 G/DL (ref 31–36)
MCV RBC AUTO: 94 FL (ref 82–100)
MONOCYTES NFR BLD AUTO: 0.6 /CMM (ref 0.1–1.3)
MONOCYTES NFR BLD AUTO: 10.4 % (ref 2–12)
NEUTROPHILS # BLD AUTO: 3.6 /CMM (ref 1.8–8.9)
NEUTROPHILS NFR BLD AUTO: 67.1 % (ref 43–81)
PHOSPHATE SERPL-MCNC: 5.9 MG/DL (ref 2.5–4.9)
PLATELET # BLD AUTO: 216 /CMM (ref 150–450)
POTASSIUM SERPL-SCNC: 4.7 MMOL/L (ref 3.5–5.1)
PROT SERPL-MCNC: 7.4 G/DL (ref 6.4–8.2)
RBC # BLD AUTO: 2.47 MIL/UL (ref 4–5.2)
SODIUM SERPL-SCNC: 134 MMOL/L (ref 136–145)
TIBC SERPL-MCNC: 177 UG/DL (ref 250–450)
TRIGL SERPL-MCNC: 20 MG/DL (ref 30–150)
TSH SERPL DL<=0.005 MIU/L-ACNC: 3.23 UIU/ML (ref 0.36–3.74)
WBC NRBC COR # BLD AUTO: 5.4 K/UL (ref 4.3–11)

## 2020-02-19 RX ADMIN — Medication SCH MG: at 21:18

## 2020-02-19 RX ADMIN — PANTOPRAZOLE SODIUM SCH MG: 40 TABLET, DELAYED RELEASE ORAL at 08:33

## 2020-02-19 RX ADMIN — ATORVASTATIN CALCIUM SCH MG: 10 TABLET, FILM COATED ORAL at 21:18

## 2020-02-19 RX ADMIN — CINACALCET HYDROCHLORIDE SCH MG: 30 TABLET, COATED ORAL at 09:25

## 2020-02-19 RX ADMIN — RENAGEL SCH MG: 800 TABLET ORAL at 13:56

## 2020-02-19 RX ADMIN — RENAGEL SCH MG: 800 TABLET ORAL at 18:08

## 2020-02-19 NOTE — NUR
RN CLOSING NOTES



PATIENT SLEEPING IN BED, BUT EASY TO AROUSE, A/OX4, ABLE TO VERBALIZE NEEDS. DENIES ANY PAIN 
AT THE MOMENT. ON OXYGEN 2L VIA NC, NO SOB OR RESPIRATORY DISTRESS NOTED. IV SITE RIGHT AC 
18G, INTACT & FLUSHING WELL, S/L. LEFT CHEST WALL HD CATHETER IN PLACE AND INTACT. PATIENT 
REFUSED NOC BIPAP DESPITE DISCUSSION OF RISK AND BENEFITS. PATIENT ABLE TO STAND AND WALK TO 
BEDSIDE COMMODE WITH ASSISTANCE. SAFETY MEASURES IN PLACE; BED IS IN LOW AND LOCKED 
POSITION, CALL LIGHT WITHIN REACH, SIDE RAILS UP X3, HOB ELEVATED, BED ALARM ON. WILL 
ENDORSE TO AM RN FOR DYAN.

## 2020-02-19 NOTE — NUR
RN TELE NOTES - OPENING





PATIENT IS AWAKE ON THE CHAIR. PATIENT IS A/O X4  ABLE TO VERBALIZE NEEDS. PATIENT DENIES 
ANY PAIN , NO SOB, NO ACUTE RESPIRATORY DISTRESS. PATIENT IS ON OXYGEN 2L VIA NC.  PATIENT 
HAS RIGHT AC 18 INTACT AND PATIENT.  PATIENT HAS LEFT CHEST WALL HD CATHETER IN PLACE AND 
INTACT. PATIENT IS ABLE TO STAND AND WALK TO BEDSIDE COMMODE WITH ASSISTANCE. PATIENT WAS 
INFORMED  THREE TIMES AND VERBALIZED TEACH BACK TO ALWAYS USE THE CALL LIGHT. BED LOCKED AND 
LOWEST POSITION CALL LIGHT WITH IN REACH ALL SAFETY MEASURE IMPLEMENTED PER HOSPITAL POLICY

## 2020-02-19 NOTE — NUR
RN OPENING NOTES:



PATIENT IN BED, AWAKE, AND VERBALLY RESPONSIVE. NO SOB, ON O2 AT 2LPM VIA NC, TOLERATING 
WELL. PER AM SHIFT NURSE, PATIENT HAD HD TODAY, 2 L REMOVED. (L) CHEST WALL HD CATH C/D/I. 
(L) AV SHUNT NOT IN USE. IV ACCESS (R) AC 18G C/D/I. FLUSHING WELL. SALINE LOCKED. SAFETY 
PRECAUTIONS IMPLEMENTED. BED LOCKED, ALARM ON, AND LOW POSITION. CALL LIGHT PLACED WITHIN 
REACH. WILL CONT. TO MONITOR.

## 2020-02-19 NOTE — NUR
RN TELE - CLOSING 

\



PATIENT IS AWAKE IN BED SITTING AT THE BEDSIDE. PATIENT IS A/O X4 PATIENT IS ABLE TO 
VERBALIZED NEEDS. PATIENT DENIES AND PAIN. NO ACUTE RESPIRATORY DISTRESS.  PATIENT IS ON 
OXYGEN 2 L VIA NC. PATIENT HAS AC 18 INTACT AND PATIENT . PATIENT HAS LEFT CHEST WALL HD 
PATIENT HAD 2000 ML FROM [facility]. PATIENT IS ABLE TO STAND AND WALK TO BEDSIDE COMMODE 
WITH ASSISTANT. PATIENT TOLD 3X TIMES TO USE CALL LIGHT IN ORDER TO BEFORE USING THE COMMODE 
. BED LOCKED AND LOWEST POSITION CALL LIGHT WITH IN REACH ALL SAFETY MEASURE IMPLEMENTED . 
PER HOSPITAL POLICY

## 2020-02-20 VITALS — SYSTOLIC BLOOD PRESSURE: 142 MMHG | DIASTOLIC BLOOD PRESSURE: 70 MMHG

## 2020-02-20 VITALS — DIASTOLIC BLOOD PRESSURE: 77 MMHG | SYSTOLIC BLOOD PRESSURE: 168 MMHG

## 2020-02-20 VITALS — SYSTOLIC BLOOD PRESSURE: 175 MMHG | DIASTOLIC BLOOD PRESSURE: 77 MMHG

## 2020-02-20 VITALS — SYSTOLIC BLOOD PRESSURE: 168 MMHG | DIASTOLIC BLOOD PRESSURE: 77 MMHG

## 2020-02-20 VITALS — DIASTOLIC BLOOD PRESSURE: 75 MMHG | SYSTOLIC BLOOD PRESSURE: 137 MMHG

## 2020-02-20 LAB
BASOPHILS # BLD AUTO: 0.1 /CMM (ref 0–0.2)
BASOPHILS NFR BLD AUTO: 1.3 % (ref 0–2)
BUN SERPL-MCNC: 29 MG/DL (ref 7–18)
CALCIUM SERPL-MCNC: 8.9 MG/DL (ref 8.5–10.1)
CHLORIDE SERPL-SCNC: 100 MMOL/L (ref 98–107)
CO2 SERPL-SCNC: 28 MMOL/L (ref 21–32)
CREAT SERPL-MCNC: 6.2 MG/DL (ref 0.6–1.3)
EOSINOPHIL NFR BLD AUTO: 15.9 % (ref 0–6)
GLUCOSE SERPL-MCNC: 83 MG/DL (ref 74–106)
HCT VFR BLD AUTO: 24 % (ref 33–45)
HGB BLD-MCNC: 7.9 G/DL (ref 11.5–14.8)
LYMPHOCYTES NFR BLD AUTO: 0.6 /CMM (ref 0.8–4.8)
LYMPHOCYTES NFR BLD AUTO: 11.6 % (ref 20–44)
MAGNESIUM SERPL-MCNC: 2.3 MG/DL (ref 1.8–2.4)
MCHC RBC AUTO-ENTMCNC: 33 G/DL (ref 31–36)
MCV RBC AUTO: 94 FL (ref 82–100)
MONOCYTES NFR BLD AUTO: 0.5 /CMM (ref 0.1–1.3)
MONOCYTES NFR BLD AUTO: 10.6 % (ref 2–12)
NEUTROPHILS # BLD AUTO: 3 /CMM (ref 1.8–8.9)
NEUTROPHILS NFR BLD AUTO: 60.6 % (ref 43–81)
PHOSPHATE SERPL-MCNC: 5.2 MG/DL (ref 2.5–4.9)
PLATELET # BLD AUTO: 213 /CMM (ref 150–450)
POTASSIUM SERPL-SCNC: 3.9 MMOL/L (ref 3.5–5.1)
RBC # BLD AUTO: 2.57 MIL/UL (ref 4–5.2)
SODIUM SERPL-SCNC: 138 MMOL/L (ref 136–145)
WBC NRBC COR # BLD AUTO: 4.9 K/UL (ref 4.3–11)

## 2020-02-20 RX ADMIN — RENAGEL SCH MG: 800 TABLET ORAL at 08:28

## 2020-02-20 RX ADMIN — CINACALCET HYDROCHLORIDE SCH MG: 30 TABLET, COATED ORAL at 08:28

## 2020-02-20 RX ADMIN — PANTOPRAZOLE SODIUM SCH MG: 40 TABLET, DELAYED RELEASE ORAL at 08:00

## 2020-02-20 RX ADMIN — Medication SCH EACH: at 11:52

## 2020-02-20 RX ADMIN — Medication SCH EACH: at 08:00

## 2020-02-20 RX ADMIN — Medication SCH EACH: at 17:50

## 2020-02-20 RX ADMIN — ATORVASTATIN CALCIUM SCH MG: 10 TABLET, FILM COATED ORAL at 21:03

## 2020-02-20 RX ADMIN — Medication SCH EACH: at 21:07

## 2020-02-20 RX ADMIN — Medication SCH MG: at 21:03

## 2020-02-20 RX ADMIN — RENAGEL SCH MG: 800 TABLET ORAL at 17:48

## 2020-02-20 RX ADMIN — RENAGEL SCH MG: 800 TABLET ORAL at 12:24

## 2020-02-20 NOTE — NUR
MS RN NOTES

PATIENT IN BED ALERT ORIENTED X 4. NO ACUTE DISTRESS NOTED. BREATHING UNLABORED. NO SOB 
NOTED. IV ACCESS PATENT AND INTACT, NO REDNESS OR SWELLING NOTED. SAFETY MEASURES IN PLACE. 
CALL LIGHT WITHIN REACH. WILL CONTINUE TO MONITOR ACCORDINGLY.

## 2020-02-20 NOTE — NUR
RN OPENING NOTES:



PATIENT IN BED, AWAKE, AND VERBALLY RESPONSIVE. NO RESPIRATORY DISTRESS, ON O2 AT 2LPM VIA 
NC, TOLERATING WELL. PER AM SHIFT NURSE, PATIENT HAD HD TODAY, 2 L REMOVED. (L) CHEST WALL 
HD CATH C/D/I. (L) AV SHUNT NOT IN USE. IV ACCESS (R) AC 18G C/D/I. FLUSHING WELL. SALINE 
LOCKED. PER AM SHIFT NURSE, [place]MENT[place] PAPERS READY AND REPORT GIVEN TO MAXIMILIAN NGUYỄN AT [facility]. ALSO STATED THAT PATIENT REFUSED TO BE DISCHARGED TONIGHT. DR. STEPHENIE BRUCE WAS 
MADE AWARE. PATIENT STATED SHE WANTS TO GET DISCHARGED ON SUNDAY MORNING INSTEAD. CHARGE 
NURSE MADE AWARE. WILL TRY TO ASK THE PATIENT AGAIN LATER. SAFETY PRECAUTIONS IMPLEMENTED. 
BED LOCKED, ALARM ON, AND LOW POSITION. CALL LIGHT PLACED WITHIN REACH. WILL CONT. TO 
MONITOR.

## 2020-02-20 NOTE — NUR
RN CLOSING NOTES:



PATIENT IN BED, AWAKE, AND VERBALLY RESPONSIVE. NO SOB, ON O2 AT 2LPM VIA NC. DR. CHIN ORDERED FOR DIALYSIS TODAY. PATIENT TO DC'D ONCE CLEARED BY NEPHRO. PATIENT HAS 
(L) CHEST WALL HD CATH C/D/I. (L) AV SHUNT NOT IN USE. IV ACCESS (R) AC 18G C/D/I. FLUSHING 
WELL. SALINE LOCKED. SAFETY PRECAUTIONS IMPLEMENTED. BED LOCKED, ALARM ON, AND LOW POSITION. 
ENDORSED TO AM SHIFT NURSE FOR CONTINUITY OF CARE.

## 2020-02-20 NOTE — NUR
MS RN NOTES

PATIENT IN BED ALERT ORIENTED X 4. NO ACUTE DISTRESS NOTED. BREATHING UNLABORED. NO SOB 
NOTED. IV ACCESS PATENT AND INTACT, NO REDNESS OR SWELLING NOTED. NEEDS ATTENDED AND 
ANTICIPATED. SAFETY MEASURES IN PLACE. CALL LIGHT WITHIN REACH.PATIENT REFUSED TO BE 
DISCHARGE AT THIS TIME, DR STEPHENIE BRUCE MADE AWARE.   WILL ENDORSE TO NIGHT NURSE FOR 
CONTINUITY OF CARE.

## 2020-02-20 NOTE — NUR
RN NOTE:



PATIENT STILL REFUSING TO BE DISCHARGED. RN AND CHARGE NURSE EXPLAINED TO PATIENT THAT DR. STEPHENIE BRUCE ORDERED FOR DC TODAY. PATIENT NOTED SCREAMING AT CHARGE NURSE AND SAYING SHE DOES 
NOT WANT TO LEAVE THE HOSPITAL YET. ALSO STATED SHE WANTS TO STAY UNTIL SUNDAY MORNING. 
CHARGE NURSE TRIED TO CONTACT  BUT NO RESPONSE. CALLED DR. BEE AND MADE 
AWARE OF THE SITUATION. PATIENT'S VITAL SIGNS /77, HR 78, RR 22, TEMP 98.4F. RECEIVED 
ORDER FOR HYDRALAZINE 25 MG PO X 1 AND TRY TO PROCEED WITH DISCHARGE.

-------------------------------------------------------------------------------

Addendum: 02/20/20 at 2313 by ELIANE AHMADI RN

-------------------------------------------------------------------------------

AT 2145, RECHECKED PATIENT'S /71 AND HR 70. PATIENT IN STABLE CONDITION AT THIS TIME. 
PATIENT NOW CALM AND COOPERATIVE. WILL CONT. TO MONITOR.

## 2020-02-20 NOTE — NUR
RN NOTE:



CALLED [facility] TO  PATIENT FOR DISCHARGE. PER CHANDLER, AMBULANCE WILL ARRIVE IN 
1.5 TO 2 HRS.

## 2020-02-20 NOTE — NUR
RN DISCHARGE NOTES:



AT 2300, PATIENT WAS PICKED UP BY [facility]. DC PAPERS AND REPORT GIVEN TO EMT. 
PATIENT IN STABLE CONDITION AND IN NO ACUTE DISTRESS. VITAL SIGNS /75, HR 71, RR 20, 
TEMP 98.4F, O2 SAT 97%. PATIENT REMAINS CALM AND COOPERATIVE AT THIS TIME. PATIENT IS GOING 
TO [facility]. SPOKE WITH MAURO AND NOTIFIED PATIENT IS ON THE WAY. PER 
CHARGE NURSE, KEEP IV LINE. RN SUPERVISOR MADE AWARE OF DISCHARGE.

## 2020-02-29 ENCOUNTER — HOSPITAL ENCOUNTER (INPATIENT)
Dept: HOSPITAL 54 - ER | Age: 75
LOS: 3 days | Discharge: NURSING, REHAB OR LONG-TERM CARE | DRG: 194 | End: 2020-03-03
Attending: INTERNAL MEDICINE | Admitting: FAMILY MEDICINE
Payer: MEDICAID

## 2020-02-29 VITALS — DIASTOLIC BLOOD PRESSURE: 72 MMHG | SYSTOLIC BLOOD PRESSURE: 144 MMHG

## 2020-02-29 VITALS — HEIGHT: 60 IN | BODY MASS INDEX: 25.32 KG/M2 | WEIGHT: 129 LBS

## 2020-02-29 DIAGNOSIS — N18.6: ICD-10-CM

## 2020-02-29 DIAGNOSIS — Z99.2: ICD-10-CM

## 2020-02-29 DIAGNOSIS — E83.41: ICD-10-CM

## 2020-02-29 DIAGNOSIS — E78.5: ICD-10-CM

## 2020-02-29 DIAGNOSIS — G47.33: ICD-10-CM

## 2020-02-29 DIAGNOSIS — I13.2: Primary | ICD-10-CM

## 2020-02-29 DIAGNOSIS — E87.5: ICD-10-CM

## 2020-02-29 DIAGNOSIS — Z91.19: ICD-10-CM

## 2020-02-29 DIAGNOSIS — R74.8: ICD-10-CM

## 2020-02-29 DIAGNOSIS — I50.33: ICD-10-CM

## 2020-02-29 DIAGNOSIS — F41.9: ICD-10-CM

## 2020-02-29 DIAGNOSIS — D63.1: ICD-10-CM

## 2020-02-29 DIAGNOSIS — N25.0: ICD-10-CM

## 2020-02-29 DIAGNOSIS — J98.11: ICD-10-CM

## 2020-02-29 DIAGNOSIS — Z79.4: ICD-10-CM

## 2020-02-29 DIAGNOSIS — K21.9: ICD-10-CM

## 2020-02-29 DIAGNOSIS — E11.22: ICD-10-CM

## 2020-02-29 DIAGNOSIS — I25.10: ICD-10-CM

## 2020-02-29 DIAGNOSIS — N25.81: ICD-10-CM

## 2020-02-29 DIAGNOSIS — E87.1: ICD-10-CM

## 2020-02-29 DIAGNOSIS — E83.39: ICD-10-CM

## 2020-02-29 DIAGNOSIS — J45.909: ICD-10-CM

## 2020-02-29 LAB
ALBUMIN SERPL BCP-MCNC: 3.7 G/DL (ref 3.4–5)
ALP SERPL-CCNC: 186 U/L (ref 46–116)
ALT SERPL W P-5'-P-CCNC: 23 U/L (ref 12–78)
AST SERPL W P-5'-P-CCNC: 27 U/L (ref 15–37)
BASOPHILS # BLD AUTO: 0.1 /CMM (ref 0–0.2)
BASOPHILS NFR BLD AUTO: 0.9 % (ref 0–2)
BILIRUB DIRECT SERPL-MCNC: 0.1 MG/DL (ref 0–0.2)
BILIRUB SERPL-MCNC: 0.5 MG/DL (ref 0.2–1)
BUN SERPL-MCNC: 70 MG/DL (ref 7–18)
CALCIUM SERPL-MCNC: 8.7 MG/DL (ref 8.5–10.1)
CHLORIDE SERPL-SCNC: 95 MMOL/L (ref 98–107)
CO2 SERPL-SCNC: 26 MMOL/L (ref 21–32)
CREAT SERPL-MCNC: 11.3 MG/DL (ref 0.6–1.3)
EOSINOPHIL NFR BLD AUTO: 13.3 % (ref 0–6)
GLUCOSE SERPL-MCNC: 86 MG/DL (ref 74–106)
HCT VFR BLD AUTO: 21 % (ref 33–45)
HGB BLD-MCNC: 7.1 G/DL (ref 11.5–14.8)
LYMPHOCYTES NFR BLD AUTO: 0.6 /CMM (ref 0.8–4.8)
LYMPHOCYTES NFR BLD AUTO: 11.2 % (ref 20–44)
MCHC RBC AUTO-ENTMCNC: 33 G/DL (ref 31–36)
MCV RBC AUTO: 95 FL (ref 82–100)
MONOCYTES NFR BLD AUTO: 0.5 /CMM (ref 0.1–1.3)
MONOCYTES NFR BLD AUTO: 9.2 % (ref 2–12)
NEUTROPHILS # BLD AUTO: 3.6 /CMM (ref 1.8–8.9)
NEUTROPHILS NFR BLD AUTO: 65.4 % (ref 43–81)
PLATELET # BLD AUTO: 231 /CMM (ref 150–450)
POTASSIUM SERPL-SCNC: 5.3 MMOL/L (ref 3.5–5.1)
PROT SERPL-MCNC: 7.6 G/DL (ref 6.4–8.2)
RBC # BLD AUTO: 2.25 MIL/UL (ref 4–5.2)
SODIUM SERPL-SCNC: 134 MMOL/L (ref 136–145)
WBC NRBC COR # BLD AUTO: 5.6 K/UL (ref 4.3–11)

## 2020-02-29 PROCEDURE — G0378 HOSPITAL OBSERVATION PER HR: HCPCS

## 2020-02-29 RX ADMIN — Medication SCH EACH: at 21:34

## 2020-02-29 RX ADMIN — Medication SCH MG: at 21:35

## 2020-02-29 RX ADMIN — CINACALCET HYDROCHLORIDE SCH MG: 30 TABLET, COATED ORAL at 21:34

## 2020-02-29 RX ADMIN — Medication PRN MG: at 20:50

## 2020-02-29 RX ADMIN — ALBUTEROL SULFATE PRN MG: 2.5 SOLUTION RESPIRATORY (INHALATION) at 20:50

## 2020-02-29 RX ADMIN — ATORVASTATIN CALCIUM SCH MG: 10 TABLET, FILM COATED ORAL at 21:34

## 2020-02-29 NOTE — NUR
BIBRA60 [facility], SOB AND ANXIETY. PER REPORT PT SKIP DIALYSIS TODAY. TO ER BED 
11, HOOKED TO CARDIAC MONITOR AND POX, O2 SATURATION AT 82% RA, PLACED ON O2 
VIA NC AT 3LPM, O2 SATURATION WENT UP TO 93%. CHANGED TO HOSP GOWN, WARM 
BLANKET PROVIDED, AWAITING MD OCHOA,.

## 2020-02-29 NOTE — NUR
RECEIVED PATIENT FROM ED VIA GURNEY IN STABLE CONDITION. NO DISTRESS NOTED. PATIENT AWAKE, 
A/O AND ABLE TO VERBALIZED NEEDS. SPO2 97% AT 4L VIA NC, REST OF VITALS WNL. KATHRYN PERMACATH 
INTACT WITH DRESSING CLEAN DRY AND INTACT. PARTH AV SHUNT INTACT WITH (+) BRUIT AND THRILL. NO 
C/O PAIN OR DISCOMFORT. ENCOURAGED USE OF CALL LIGHT FOR ASSISTANCE AND VERBALIZED GOOD 
UNDERSTANDING. ROOM FREE OF CLUTTER AND BELONGINGS KEPT NEAR BEDSIDE. BED IN LOW LOCK 
SETTING WITH BED ALARM ON AND FUNCTIONING PROPERLY. WILL CONTINUE TO MONITOR

## 2020-03-01 VITALS — DIASTOLIC BLOOD PRESSURE: 77 MMHG | SYSTOLIC BLOOD PRESSURE: 157 MMHG

## 2020-03-01 VITALS — DIASTOLIC BLOOD PRESSURE: 65 MMHG | SYSTOLIC BLOOD PRESSURE: 132 MMHG

## 2020-03-01 VITALS — DIASTOLIC BLOOD PRESSURE: 62 MMHG | SYSTOLIC BLOOD PRESSURE: 128 MMHG

## 2020-03-01 VITALS — SYSTOLIC BLOOD PRESSURE: 132 MMHG | DIASTOLIC BLOOD PRESSURE: 65 MMHG

## 2020-03-01 LAB
BASOPHILS # BLD AUTO: 0.1 /CMM (ref 0–0.2)
BASOPHILS NFR BLD AUTO: 1.3 % (ref 0–2)
BUN SERPL-MCNC: 77 MG/DL (ref 7–18)
CALCIUM SERPL-MCNC: 7.9 MG/DL (ref 8.5–10.1)
CHLORIDE SERPL-SCNC: 95 MMOL/L (ref 98–107)
CHOLEST SERPL-MCNC: 144 MG/DL (ref ?–200)
CO2 SERPL-SCNC: 24 MMOL/L (ref 21–32)
CREAT SERPL-MCNC: 12.1 MG/DL (ref 0.6–1.3)
EOSINOPHIL NFR BLD AUTO: 12.1 % (ref 0–6)
GLUCOSE SERPL-MCNC: 77 MG/DL (ref 74–106)
HCT VFR BLD AUTO: 21 % (ref 33–45)
HDLC SERPL-MCNC: 117 MG/DL (ref 40–60)
HGB BLD-MCNC: 7.2 G/DL (ref 11.5–14.8)
LDLC SERPL DIRECT ASSAY-MCNC: 21 MG/DL (ref 0–99)
LYMPHOCYTES NFR BLD AUTO: 0.7 /CMM (ref 0.8–4.8)
LYMPHOCYTES NFR BLD AUTO: 13.3 % (ref 20–44)
MAGNESIUM SERPL-MCNC: 2.8 MG/DL (ref 1.8–2.4)
MCHC RBC AUTO-ENTMCNC: 34 G/DL (ref 31–36)
MCV RBC AUTO: 95 FL (ref 82–100)
MONOCYTES NFR BLD AUTO: 0.6 /CMM (ref 0.1–1.3)
MONOCYTES NFR BLD AUTO: 10.1 % (ref 2–12)
NEUTROPHILS # BLD AUTO: 3.5 /CMM (ref 1.8–8.9)
NEUTROPHILS NFR BLD AUTO: 63.2 % (ref 43–81)
PHOSPHATE SERPL-MCNC: 7.1 MG/DL (ref 2.5–4.9)
PLATELET # BLD AUTO: 230 /CMM (ref 150–450)
POTASSIUM SERPL-SCNC: 5.3 MMOL/L (ref 3.5–5.1)
RBC # BLD AUTO: 2.26 MIL/UL (ref 4–5.2)
SODIUM SERPL-SCNC: 134 MMOL/L (ref 136–145)
TRIGL SERPL-MCNC: 4 MG/DL (ref 30–150)
WBC NRBC COR # BLD AUTO: 5.5 K/UL (ref 4.3–11)

## 2020-03-01 PROCEDURE — 5A1D70Z PERFORMANCE OF URINARY FILTRATION, INTERMITTENT, LESS THAN 6 HOURS PER DAY: ICD-10-PCS | Performed by: INTERNAL MEDICINE

## 2020-03-01 RX ADMIN — Medication SCH MG: at 21:27

## 2020-03-01 RX ADMIN — Medication PRN MG: at 14:51

## 2020-03-01 RX ADMIN — Medication SCH MG: at 08:52

## 2020-03-01 RX ADMIN — Medication SCH EACH: at 21:27

## 2020-03-01 RX ADMIN — FERROUS SULFATE TAB 325 MG (65 MG ELEMENTAL FE) SCH MG: 325 (65 FE) TAB at 08:52

## 2020-03-01 RX ADMIN — ALBUTEROL SULFATE PRN MG: 2.5 SOLUTION RESPIRATORY (INHALATION) at 03:26

## 2020-03-01 RX ADMIN — RENAGEL SCH MG: 800 TABLET ORAL at 08:52

## 2020-03-01 RX ADMIN — Medication PRN MG: at 19:36

## 2020-03-01 RX ADMIN — THERA TABS SCH UDTAB: TAB at 08:52

## 2020-03-01 RX ADMIN — Medication SCH EACH: at 16:54

## 2020-03-01 RX ADMIN — Medication SCH EACH: at 11:30

## 2020-03-01 RX ADMIN — Medication PRN MG: at 03:26

## 2020-03-01 RX ADMIN — OXYCODONE HYDROCHLORIDE AND ACETAMINOPHEN SCH MG: 500 TABLET ORAL at 08:52

## 2020-03-01 RX ADMIN — RENAGEL SCH MG: 800 TABLET ORAL at 16:55

## 2020-03-01 RX ADMIN — ALBUTEROL SULFATE PRN MG: 2.5 SOLUTION RESPIRATORY (INHALATION) at 19:36

## 2020-03-01 RX ADMIN — ALBUTEROL SULFATE PRN MG: 2.5 SOLUTION RESPIRATORY (INHALATION) at 08:52

## 2020-03-01 RX ADMIN — ATORVASTATIN CALCIUM SCH MG: 10 TABLET, FILM COATED ORAL at 21:27

## 2020-03-01 RX ADMIN — Medication SCH MG: at 23:18

## 2020-03-01 RX ADMIN — RENAGEL SCH MG: 800 TABLET ORAL at 13:00

## 2020-03-01 RX ADMIN — CINACALCET HYDROCHLORIDE SCH MG: 30 TABLET, COATED ORAL at 21:27

## 2020-03-01 RX ADMIN — Medication PRN MG: at 08:52

## 2020-03-01 RX ADMIN — Medication SCH MG: at 16:52

## 2020-03-01 RX ADMIN — Medication SCH MG: at 16:51

## 2020-03-01 RX ADMIN — Medication SCH EACH: at 07:30

## 2020-03-01 RX ADMIN — ALBUTEROL SULFATE PRN MG: 2.5 SOLUTION RESPIRATORY (INHALATION) at 14:51

## 2020-03-01 NOTE — NUR
MS RN OPENING NOTES



RECEIVED PATIENT FROM MORNING SHIFT, ALERT AND ORIENTED X 3. ANXIOUS AND REQUESTING FOR 
BREATHING TREATMENT, RT NOTIFIED. FAMILY ON BEDSIDE. BREATHING REGULAR AND UNLABORED ON 
OXYGEN AT 4L/MIN VIA NASAL CANNULA. RIGHT AC G20 IV LINE INTACT AND PATENT, FLUSHING WELL 
WITH NO BLEEDING OR S/S OF INFILTRATION NOTED. BODY ASSESSMENT DONE, SKIN REMAINED INTACT 
CLEAN AND DRY. NO COMPLAINTS OF PAIN/DISCOMFORT REPORTED AS OF THE TIME. BED LOW AND LOCKED 
ON SEMI FOWLERS POSITION. CALL LIGHT IN REACH. WILL CONTINUE TO MONITOR.

## 2020-03-01 NOTE — NUR
MS RN NOTES



DIALYSIS SESSION STARTED. VITAL SIGNS TAKEN. NO S/S OF DISTRESS NOTED AT THIS TIME. NO 
ACTIVE BLEEDING SEEN ON HD CATH. WILL CONTINUE TO MONITOR.

## 2020-03-01 NOTE — NUR
alert, oriented, able to walk from bed to bathroom, or her bsc, no complaint of pain, no sob 
noted, denied dyspnea.

mood lability noted, yelling, screaming for no reason.  dangling, and claimed she cant 
breathe, demands neb tx almost every 4hrs.

awaiting dialysis this evening, explained to her , by her nephrologist in room

## 2020-03-01 NOTE — NUR
MS TN NOTES 



PATIENT ASLEEP IN BED WITH NO DISTRESS NOTED. CALL LIGHT WITHIN REACH. NO C/O PAIN OR 
DISCOMFORT. KATHRYN PERMACATH INTACT WITH DRESSING CLEAN DRY AND INTACT. PARTH AV SHUNT INTACT 
WITH (+) BRUIT AND THRILL. PERIPHERAL LINE INTACT AND PATENT. ROOM FREE OF CLUTTER AND 
BELONGINGS KEPT NEAR BEDSIDE. BED IN LOW LOCK SETTING WITH BED ALARM ON AND FUNCTIONING 
PROPERLY. WILL CONTINUE TO MONITOR

## 2020-03-02 VITALS — SYSTOLIC BLOOD PRESSURE: 140 MMHG | DIASTOLIC BLOOD PRESSURE: 71 MMHG

## 2020-03-02 VITALS — SYSTOLIC BLOOD PRESSURE: 130 MMHG | DIASTOLIC BLOOD PRESSURE: 66 MMHG

## 2020-03-02 VITALS — SYSTOLIC BLOOD PRESSURE: 109 MMHG | DIASTOLIC BLOOD PRESSURE: 52 MMHG

## 2020-03-02 VITALS — DIASTOLIC BLOOD PRESSURE: 79 MMHG | SYSTOLIC BLOOD PRESSURE: 159 MMHG

## 2020-03-02 VITALS — SYSTOLIC BLOOD PRESSURE: 115 MMHG | DIASTOLIC BLOOD PRESSURE: 60 MMHG

## 2020-03-02 VITALS — DIASTOLIC BLOOD PRESSURE: 59 MMHG | SYSTOLIC BLOOD PRESSURE: 115 MMHG

## 2020-03-02 VITALS — DIASTOLIC BLOOD PRESSURE: 65 MMHG | SYSTOLIC BLOOD PRESSURE: 119 MMHG

## 2020-03-02 LAB
BASOPHILS # BLD AUTO: 0 /CMM (ref 0–0.2)
BASOPHILS NFR BLD AUTO: 0.8 % (ref 0–2)
BUN SERPL-MCNC: 40 MG/DL (ref 7–18)
CALCIUM SERPL-MCNC: 8 MG/DL (ref 8.5–10.1)
CHLORIDE SERPL-SCNC: 97 MMOL/L (ref 98–107)
CO2 SERPL-SCNC: 24 MMOL/L (ref 21–32)
CREAT SERPL-MCNC: 6.8 MG/DL (ref 0.6–1.3)
EOSINOPHIL NFR BLD AUTO: 12.3 % (ref 0–6)
EOSINOPHIL NFR BLD MANUAL: 11 % (ref 0–4)
GLUCOSE SERPL-MCNC: 83 MG/DL (ref 74–106)
HCT VFR BLD AUTO: 21 % (ref 33–45)
HCT VFR BLD AUTO: 27 % (ref 33–45)
HGB BLD-MCNC: 6.8 G/DL (ref 11.5–14.8)
HGB BLD-MCNC: 8.7 G/DL (ref 11.5–14.8)
LYMPHOCYTES NFR BLD AUTO: 0.5 /CMM (ref 0.8–4.8)
LYMPHOCYTES NFR BLD AUTO: 11.1 % (ref 20–44)
LYMPHOCYTES NFR BLD MANUAL: 10 % (ref 16–48)
MCHC RBC AUTO-ENTMCNC: 32 G/DL (ref 31–36)
MCV RBC AUTO: 95 FL (ref 82–100)
MONOCYTES NFR BLD AUTO: 0.5 /CMM (ref 0.1–1.3)
MONOCYTES NFR BLD AUTO: 12.2 % (ref 2–12)
MONOCYTES NFR BLD MANUAL: 13 % (ref 0–11)
NEUTROPHILS # BLD AUTO: 2.8 /CMM (ref 1.8–8.9)
NEUTROPHILS NFR BLD AUTO: 63.6 % (ref 43–81)
NEUTS SEG NFR BLD MANUAL: 66 % (ref 42–76)
PLATELET # BLD AUTO: 196 /CMM (ref 150–450)
POTASSIUM SERPL-SCNC: 4.2 MMOL/L (ref 3.5–5.1)
RBC # BLD AUTO: 2.2 MIL/UL (ref 4–5.2)
SODIUM SERPL-SCNC: 135 MMOL/L (ref 136–145)
WBC NRBC COR # BLD AUTO: 4.4 K/UL (ref 4.3–11)

## 2020-03-02 PROCEDURE — 30233N1 TRANSFUSION OF NONAUTOLOGOUS RED BLOOD CELLS INTO PERIPHERAL VEIN, PERCUTANEOUS APPROACH: ICD-10-PCS | Performed by: FAMILY MEDICINE

## 2020-03-02 RX ADMIN — Medication SCH EACH: at 12:03

## 2020-03-02 RX ADMIN — Medication SCH MG: at 17:27

## 2020-03-02 RX ADMIN — Medication SCH MG: at 09:00

## 2020-03-02 RX ADMIN — Medication SCH MG: at 21:36

## 2020-03-02 RX ADMIN — RENAGEL SCH MG: 800 TABLET ORAL at 17:26

## 2020-03-02 RX ADMIN — FERROUS SULFATE TAB 325 MG (65 MG ELEMENTAL FE) SCH MG: 325 (65 FE) TAB at 08:59

## 2020-03-02 RX ADMIN — ATORVASTATIN CALCIUM SCH MG: 10 TABLET, FILM COATED ORAL at 21:36

## 2020-03-02 RX ADMIN — Medication SCH MG: at 20:30

## 2020-03-02 RX ADMIN — THERA TABS SCH UDTAB: TAB at 09:00

## 2020-03-02 RX ADMIN — CINACALCET HYDROCHLORIDE SCH MG: 30 TABLET, COATED ORAL at 21:36

## 2020-03-02 RX ADMIN — Medication SCH MG: at 07:46

## 2020-03-02 RX ADMIN — Medication SCH MG: at 08:59

## 2020-03-02 RX ADMIN — Medication SCH MG: at 23:33

## 2020-03-02 RX ADMIN — PANTOPRAZOLE SODIUM SCH MG: 40 TABLET, DELAYED RELEASE ORAL at 13:16

## 2020-03-02 RX ADMIN — Medication SCH MG: at 15:02

## 2020-03-02 RX ADMIN — Medication SCH EACH: at 07:30

## 2020-03-02 RX ADMIN — RENAGEL SCH MG: 800 TABLET ORAL at 08:59

## 2020-03-02 RX ADMIN — Medication SCH MG: at 11:04

## 2020-03-02 RX ADMIN — RENAGEL SCH MG: 800 TABLET ORAL at 13:16

## 2020-03-02 RX ADMIN — Medication SCH EACH: at 17:27

## 2020-03-02 RX ADMIN — OXYCODONE HYDROCHLORIDE AND ACETAMINOPHEN SCH MG: 500 TABLET ORAL at 08:59

## 2020-03-02 RX ADMIN — Medication SCH MG: at 03:21

## 2020-03-02 RX ADMIN — Medication SCH EACH: at 21:40

## 2020-03-02 NOTE — NUR
rn notes



Patient remains on 2 L nasal cannula at this time, no sob noted, a/o x3 and denies pain at 
this time.  R AC 20 SL, LUCW perma cath.  PARTH AV shunt is not working.  Patient is status 
post blood transfusion, vital signs remain stable through out.  Awaiting for blood to be 
drawn from LAB at this time, as their first attempt failed.  Bed at the lowest setting, call 
light within reach, side rails up x2.  Will give report to NOC RN for DYAN bedside.

## 2020-03-02 NOTE — NUR
RN OPENING NOTES:



Received pt resting in bed A&Ox2. On 2L/min NC tolerating well. No respiratory distress 
noted. Pt is ambulatory w/ assist. Has a bedside commode. Has IV site on right AC #20 patent 
and flushing. Dressing c/d/i, Has KATHRYN permacath. Has PARTH AV shunt that does not work. Pt is 
s/p blood transfusion. Awaiting H&H lab results. Safety measures in place. Bed in lowest and 
locked position, side rails up x2, call light w/in reach. Will continue to monitor.

## 2020-03-02 NOTE — NUR
rn opening notes



Patient received on nasal cannula, 4L per minute.  No sob noted, a.i x3 at this time.  EDISON frey, R AC 20 SL, PARTH SV shunt not working at this time.  Bed at the lowest setting, 
call light within reach, side rails up x2.

## 2020-03-02 NOTE — NUR
RN NOTE:



Pt's . Refused insulin. Stated "I do not take insulin" Explained risks and benefits x3 
and continued to refuse. Charge nurse made aware. Will continue to monitor.

## 2020-03-02 NOTE — NUR
RN NOTE:



Pt noted to have pulled out IV site. No infiltration or pain noted. Charge nurse made aware. 
Started a new line on right wrist #24 patent and flushing.

## 2020-03-02 NOTE — NUR
MS RN CLOSING NOTES



PATIENT IN BED ALERT AND ORIENTED X 3. VERBALLY RESPONSIVE AND ABLE TO FOLLOW DIRECTIONS. 
BREATHING REGULAR AND UNLABORED ON OXYGEN AT 4L/MIN VIA NASAL CANNULA. RIGHT AC G20 IV LINE 
PATENT AND FLUSHING WELL. S/P HD 2000cc OUTPUT. NO COMPLAINTS OF PAIN/DISCOMFORT REPORTED 
THE WHOLE SHIFT. BED LOW AND LOCKED ON SEMI FOWLERS POSITION. CALL LIGHT IN REACH. WILL 
ENDORSE TO MORNING SHIFT FOR DYAN.

## 2020-03-03 VITALS — DIASTOLIC BLOOD PRESSURE: 66 MMHG | SYSTOLIC BLOOD PRESSURE: 144 MMHG

## 2020-03-03 VITALS — SYSTOLIC BLOOD PRESSURE: 121 MMHG | DIASTOLIC BLOOD PRESSURE: 72 MMHG

## 2020-03-03 VITALS — SYSTOLIC BLOOD PRESSURE: 148 MMHG | DIASTOLIC BLOOD PRESSURE: 102 MMHG

## 2020-03-03 LAB
BASOPHILS # BLD AUTO: 0 /CMM (ref 0–0.2)
BASOPHILS NFR BLD AUTO: 0.6 % (ref 0–2)
BUN SERPL-MCNC: 51 MG/DL (ref 7–18)
CALCIUM SERPL-MCNC: 8.1 MG/DL (ref 8.5–10.1)
CHLORIDE SERPL-SCNC: 95 MMOL/L (ref 98–107)
CO2 SERPL-SCNC: 26 MMOL/L (ref 21–32)
CREAT SERPL-MCNC: 7.8 MG/DL (ref 0.6–1.3)
EOSINOPHIL NFR BLD AUTO: 13.7 % (ref 0–6)
GLUCOSE SERPL-MCNC: 96 MG/DL (ref 74–106)
HCT VFR BLD AUTO: 24 % (ref 33–45)
HGB BLD-MCNC: 7.8 G/DL (ref 11.5–14.8)
LYMPHOCYTES NFR BLD AUTO: 0.5 /CMM (ref 0.8–4.8)
LYMPHOCYTES NFR BLD AUTO: 8.6 % (ref 20–44)
MCHC RBC AUTO-ENTMCNC: 33 G/DL (ref 31–36)
MCV RBC AUTO: 94 FL (ref 82–100)
MONOCYTES NFR BLD AUTO: 0.6 /CMM (ref 0.1–1.3)
MONOCYTES NFR BLD AUTO: 11 % (ref 2–12)
NEUTROPHILS # BLD AUTO: 3.5 /CMM (ref 1.8–8.9)
NEUTROPHILS NFR BLD AUTO: 66.1 % (ref 43–81)
PLATELET # BLD AUTO: 183 /CMM (ref 150–450)
POTASSIUM SERPL-SCNC: 5 MMOL/L (ref 3.5–5.1)
RBC # BLD AUTO: 2.53 MIL/UL (ref 4–5.2)
SODIUM SERPL-SCNC: 132 MMOL/L (ref 136–145)
WBC NRBC COR # BLD AUTO: 5.2 K/UL (ref 4.3–11)

## 2020-03-03 RX ADMIN — Medication SCH MG: at 08:45

## 2020-03-03 RX ADMIN — Medication SCH MG: at 14:33

## 2020-03-03 RX ADMIN — Medication SCH MG: at 07:54

## 2020-03-03 RX ADMIN — Medication SCH MG: at 08:46

## 2020-03-03 RX ADMIN — RENAGEL SCH MG: 800 TABLET ORAL at 13:51

## 2020-03-03 RX ADMIN — Medication SCH EACH: at 13:52

## 2020-03-03 RX ADMIN — Medication SCH MG: at 13:52

## 2020-03-03 RX ADMIN — Medication SCH MG: at 11:17

## 2020-03-03 RX ADMIN — THERA TABS SCH UDTAB: TAB at 08:45

## 2020-03-03 RX ADMIN — OXYCODONE HYDROCHLORIDE AND ACETAMINOPHEN SCH MG: 500 TABLET ORAL at 08:46

## 2020-03-03 RX ADMIN — PANTOPRAZOLE SODIUM SCH MG: 40 TABLET, DELAYED RELEASE ORAL at 08:45

## 2020-03-03 RX ADMIN — Medication SCH EACH: at 08:47

## 2020-03-03 RX ADMIN — Medication SCH MG: at 04:48

## 2020-03-03 RX ADMIN — FERROUS SULFATE TAB 325 MG (65 MG ELEMENTAL FE) SCH MG: 325 (65 FE) TAB at 08:45

## 2020-03-03 RX ADMIN — RENAGEL SCH MG: 800 TABLET ORAL at 08:46

## 2020-03-03 NOTE — NUR
ms rn

patient was refusing to go back to facility but afreed after,no distress noted.refused to 
remove the iv, endorsed to etienne goldstein.

## 2020-03-03 NOTE — NUR
RN CLOSING NOTES:

Pt resting in bed on 2L/min NC tolerating well. No SOB or respiratory distress noted during 
shift. No acute changes noted during shift. IV site on right wrist patent and flushing. 
Dressing c/d/i. All medications given as ordered. Bed in lowest and locked position, side 
rails up x2, call light within reach. Will endorse to AM nurse for DYAN.

## 2020-03-03 NOTE — NUR
MS RN

RECEIVED ON BED,AWAKE,ALERT,ORIENTED  X4,NOT IN ANY FORM OF DISTRESS, RESPIRATIONS EVEN AND 
UNLABORED,NO  SOD NOTED,LUNGS ARE DIMINISHED,ABDOMEN SOFT,POSITIVE BOWEL SOUNDS,DENIES PAIN 
AT THIS TIME,ALL NEEDS ATTENDED.

## 2020-09-11 NOTE — NUR
Recovery After Procedural Sedation (Adult)  You have been given medicine by vein to make you sleep during your procedure. This may have included both a pain medicine and sleeping medicine. Most of the effects have worn off. But you may still have some drowsiness for the next 6 to 8 hours.  Home care  Follow these guidelines when you get home:  · For the next 8 hours, you should be watched by a responsible adult. This person should make sure your condition is not getting worse.  · Don't drink any alcohol for the next 24 hours.  · Don't drive, operate dangerous machinery, make important business or personal decisions, or sign legal documents during the next 24 hours.  Note: Your healthcare provider may tell you not to take any medicine by mouth for pain or sleep in the next 4 hours. These medicines may react with the medicines you were given in the hospital. This could cause a much stronger response than usual.  Follow-up care  Follow up with your healthcare provider if you are not alert and back to your usual level of activity within 12 hours.  When to seek medical advice  Call your healthcare provider right away if any of these occur:  · Drowsiness gets worse  · Weakness or dizziness gets worse  · Repeated vomiting  · You can't be awakened   Date Last Reviewed: 10/18/2016  © 2402-8883 [employer]. [address] [place] [address], [place] [address]. All rights reserved. This information is not intended as a substitute for professional medical care. Always follow your healthcare professional's instructions.        Transesophageal Echocardiography (MABLE)  Transesophageal echocardiography (MABLE) is a test done to record images of your heart with a probe inside your throat (esophagus). These images help your healthcare provider find and treat problems such as infection, disease, or defects in your heart’s function, walls or valves. This test may be done when a chest echocardiogram (transthoracic) does not give your  MS RN NOTES



S/P HEMODIALYSIS WITH 2000CC OUTPUT. /62 HR72 RR18 Temp98. NO ACTIVE BLEEDING NOTED ON 
LEFT UPPER CHEST PERMACATH. REMAINED ALERT AND ORIENTED X 3. NO COMPLAINTS OF 
PAIN/I\DISCOMFORT REPORTED. WILL CONTINUE TO MONITOR. provider enough information.   Before your test  · Tell your provider about all the medicines you take. Ask if you should take them before the test. Your stomach typically should be empty for this test to prevent vomiting so your provider will likely tell you not to take them.  · Follow any directions you are given for not eating or drinking before the procedure.  · Tell your healthcare provider if you have ulcers, a hiatal hernia, or problems swallowing. Also report a history of narrowing of the esophagus, or any other previous gastrointestinal problems.  A smaller probe that may be needed for your study.  · Also let him or her know of any allergies to medicines or sedatives.  · Also let your provider know if you have dental implants or dentures that should be removed before the test.  · Arrange to have someone drive you home after the exam. You will be given a sedative for the test. It won't be safe to drive for a period of time.    During your MABLE  · When you arrive for your MABLE, you will change into a hospital gown, and then be taken to the testing room.  · Your provider will spray your throat with a numbing medicine. You may be given a medicine through an IV (intravenous) in your arm to help you relax. You may also be given oxygen. Then you’ll be asked to lie on your left side.  · The healthcare provider gently puts the small, lubricated probe into your mouth. A small plastic or rubber bite-block will be put in your mouth to prevent you from biting down on the probe during the test. As you swallow, the provider will slowly guide the tube into your esophagus.  · You may feel the healthcare provider moving the probe, but it shouldn’t hurt or interfere with your breathing. A nurse checks your heart rate, blood pressure, and breathing. The test usually takes 15 to 30min.  · The nurse or assistant will suction any saliva out of your mouth, similar to when you have a dental cleaning.    After the test  · Tell your  healthcare provider about any pain, or if you cough up or vomit blood, or have trouble swallowing.  · You can eat and drink again when your throat is no longer numb and you are fully awake.  · Don't drive a car or run heavy machinery for at least 24 hours after getting sedation. After 24 hours you can return to normal activity unless your healthcare provider tells you otherwise.  · Be sure to keep your follow-up appointment to go over the results with your healthcare provider.  · Your next appointment is: ____________________    Rolando last reviewed this educational content on 7/1/2019  © 2916-6720 [employer]. [address] [place] [address], [place] [address]. All rights reserved. This information is not intended as a substitute for professional medical care. Always follow your healthcare professional's instructions.